# Patient Record
Sex: FEMALE | Race: WHITE | Employment: OTHER | URBAN - METROPOLITAN AREA
[De-identification: names, ages, dates, MRNs, and addresses within clinical notes are randomized per-mention and may not be internally consistent; named-entity substitution may affect disease eponyms.]

---

## 2019-06-24 ENCOUNTER — APPOINTMENT (OUTPATIENT)
Dept: GENERAL RADIOLOGY | Facility: CLINIC | Age: 64
DRG: 193 | End: 2019-06-24
Attending: FAMILY MEDICINE
Payer: COMMERCIAL

## 2019-06-24 ENCOUNTER — HOSPITAL ENCOUNTER (INPATIENT)
Facility: CLINIC | Age: 64
LOS: 3 days | Discharge: HOME | DRG: 193 | End: 2019-06-29
Attending: FAMILY MEDICINE | Admitting: INTERNAL MEDICINE
Payer: COMMERCIAL

## 2019-06-24 DIAGNOSIS — F33.0 MILD EPISODE OF RECURRENT MAJOR DEPRESSIVE DISORDER (H): ICD-10-CM

## 2019-06-24 DIAGNOSIS — J01.90 ACUTE NON-RECURRENT SINUSITIS, UNSPECIFIED LOCATION: ICD-10-CM

## 2019-06-24 DIAGNOSIS — J44.1 COPD EXACERBATION (H): ICD-10-CM

## 2019-06-24 DIAGNOSIS — J18.9 PNEUMONIA OF BOTH LOWER LOBES DUE TO INFECTIOUS ORGANISM: ICD-10-CM

## 2019-06-24 DIAGNOSIS — Z72.0 TOBACCO ABUSE: Primary | ICD-10-CM

## 2019-06-24 LAB
ALBUMIN SERPL-MCNC: 3.2 G/DL (ref 3.4–5)
ALP SERPL-CCNC: 87 U/L (ref 40–150)
ALT SERPL W P-5'-P-CCNC: 28 U/L (ref 0–50)
ANION GAP SERPL CALCULATED.3IONS-SCNC: 8 MMOL/L (ref 3–14)
AST SERPL W P-5'-P-CCNC: 18 U/L (ref 0–45)
BASOPHILS # BLD AUTO: 0.1 10E9/L (ref 0–0.2)
BASOPHILS NFR BLD AUTO: 0.4 %
BILIRUB SERPL-MCNC: 0.5 MG/DL (ref 0.2–1.3)
BUN SERPL-MCNC: 8 MG/DL (ref 7–30)
CALCIUM SERPL-MCNC: 9 MG/DL (ref 8.5–10.1)
CHLORIDE SERPL-SCNC: 102 MMOL/L (ref 94–109)
CO2 SERPL-SCNC: 26 MMOL/L (ref 20–32)
CREAT SERPL-MCNC: 0.44 MG/DL (ref 0.52–1.04)
DIFFERENTIAL METHOD BLD: ABNORMAL
EOSINOPHIL # BLD AUTO: 0 10E9/L (ref 0–0.7)
EOSINOPHIL NFR BLD AUTO: 0.1 %
ERYTHROCYTE [DISTWIDTH] IN BLOOD BY AUTOMATED COUNT: 12.7 % (ref 10–15)
GFR SERPL CREATININE-BSD FRML MDRD: >90 ML/MIN/{1.73_M2}
GLUCOSE SERPL-MCNC: 115 MG/DL (ref 70–99)
HCT VFR BLD AUTO: 40.8 % (ref 35–47)
HGB BLD-MCNC: 13.2 G/DL (ref 11.7–15.7)
IMM GRANULOCYTES # BLD: 0 10E9/L (ref 0–0.4)
IMM GRANULOCYTES NFR BLD: 0.3 %
INTERPRETATION ECG - MUSE: NORMAL
LACTATE BLD-SCNC: 1.1 MMOL/L (ref 0.7–2)
LYMPHOCYTES # BLD AUTO: 0.7 10E9/L (ref 0.8–5.3)
LYMPHOCYTES NFR BLD AUTO: 5.7 %
MCH RBC QN AUTO: 32.8 PG (ref 26.5–33)
MCHC RBC AUTO-ENTMCNC: 32.4 G/DL (ref 31.5–36.5)
MCV RBC AUTO: 101 FL (ref 78–100)
MONOCYTES # BLD AUTO: 1.7 10E9/L (ref 0–1.3)
MONOCYTES NFR BLD AUTO: 14.7 %
NEUTROPHILS # BLD AUTO: 9.2 10E9/L (ref 1.6–8.3)
NEUTROPHILS NFR BLD AUTO: 78.8 %
NRBC # BLD AUTO: 0 10*3/UL
NRBC BLD AUTO-RTO: 0 /100
NT-PROBNP SERPL-MCNC: 450 PG/ML (ref 0–900)
PLATELET # BLD AUTO: 297 10E9/L (ref 150–450)
POTASSIUM SERPL-SCNC: 3.9 MMOL/L (ref 3.4–5.3)
PROCALCITONIN SERPL-MCNC: 0.32 NG/ML
PROT SERPL-MCNC: 7.1 G/DL (ref 6.8–8.8)
RBC # BLD AUTO: 4.03 10E12/L (ref 3.8–5.2)
SODIUM SERPL-SCNC: 136 MMOL/L (ref 133–144)
TROPONIN I SERPL-MCNC: <0.015 UG/L (ref 0–0.04)
WBC # BLD AUTO: 11.7 10E9/L (ref 4–11)

## 2019-06-24 PROCEDURE — G0378 HOSPITAL OBSERVATION PER HR: HCPCS

## 2019-06-24 PROCEDURE — 25000128 H RX IP 250 OP 636: Performed by: FAMILY MEDICINE

## 2019-06-24 PROCEDURE — 25000132 ZZH RX MED GY IP 250 OP 250 PS 637: Performed by: PHYSICIAN ASSISTANT

## 2019-06-24 PROCEDURE — 80053 COMPREHEN METABOLIC PANEL: CPT | Performed by: EMERGENCY MEDICINE

## 2019-06-24 PROCEDURE — 93010 ELECTROCARDIOGRAM REPORT: CPT | Mod: Z6 | Performed by: FAMILY MEDICINE

## 2019-06-24 PROCEDURE — 25800030 ZZH RX IP 258 OP 636: Performed by: FAMILY MEDICINE

## 2019-06-24 PROCEDURE — 40000556 ZZH STATISTIC PERIPHERAL IV START W US GUIDANCE

## 2019-06-24 PROCEDURE — 96375 TX/PRO/DX INJ NEW DRUG ADDON: CPT

## 2019-06-24 PROCEDURE — 93005 ELECTROCARDIOGRAM TRACING: CPT

## 2019-06-24 PROCEDURE — 94640 AIRWAY INHALATION TREATMENT: CPT

## 2019-06-24 PROCEDURE — 87040 BLOOD CULTURE FOR BACTERIA: CPT | Performed by: FAMILY MEDICINE

## 2019-06-24 PROCEDURE — 83605 ASSAY OF LACTIC ACID: CPT | Performed by: EMERGENCY MEDICINE

## 2019-06-24 PROCEDURE — 85025 COMPLETE CBC W/AUTO DIFF WBC: CPT | Performed by: EMERGENCY MEDICINE

## 2019-06-24 PROCEDURE — 84145 PROCALCITONIN (PCT): CPT | Performed by: EMERGENCY MEDICINE

## 2019-06-24 PROCEDURE — 40000275 ZZH STATISTIC RCP TIME EA 10 MIN

## 2019-06-24 PROCEDURE — 25000125 ZZHC RX 250: Performed by: PHYSICIAN ASSISTANT

## 2019-06-24 PROCEDURE — 25000125 ZZHC RX 250: Performed by: FAMILY MEDICINE

## 2019-06-24 PROCEDURE — 96361 HYDRATE IV INFUSION ADD-ON: CPT

## 2019-06-24 PROCEDURE — 71045 X-RAY EXAM CHEST 1 VIEW: CPT

## 2019-06-24 PROCEDURE — 84484 ASSAY OF TROPONIN QUANT: CPT | Performed by: EMERGENCY MEDICINE

## 2019-06-24 PROCEDURE — 96365 THER/PROPH/DIAG IV INF INIT: CPT

## 2019-06-24 PROCEDURE — 94640 AIRWAY INHALATION TREATMENT: CPT | Mod: 76

## 2019-06-24 PROCEDURE — 25000132 ZZH RX MED GY IP 250 OP 250 PS 637: Performed by: FAMILY MEDICINE

## 2019-06-24 PROCEDURE — 99285 EMERGENCY DEPT VISIT HI MDM: CPT | Mod: 25 | Performed by: FAMILY MEDICINE

## 2019-06-24 PROCEDURE — 99285 EMERGENCY DEPT VISIT HI MDM: CPT | Mod: 25

## 2019-06-24 PROCEDURE — 83880 ASSAY OF NATRIURETIC PEPTIDE: CPT | Performed by: EMERGENCY MEDICINE

## 2019-06-24 RX ORDER — IPRATROPIUM BROMIDE AND ALBUTEROL SULFATE 2.5; .5 MG/3ML; MG/3ML
3 SOLUTION RESPIRATORY (INHALATION) EVERY 4 HOURS
Status: DISCONTINUED | OUTPATIENT
Start: 2019-06-24 | End: 2019-06-25

## 2019-06-24 RX ORDER — METHYLPREDNISOLONE SODIUM SUCCINATE 125 MG/2ML
125 INJECTION, POWDER, LYOPHILIZED, FOR SOLUTION INTRAMUSCULAR; INTRAVENOUS ONCE
Status: COMPLETED | OUTPATIENT
Start: 2019-06-24 | End: 2019-06-24

## 2019-06-24 RX ORDER — ACETAMINOPHEN 325 MG/1
650 TABLET ORAL EVERY 4 HOURS PRN
Status: DISCONTINUED | OUTPATIENT
Start: 2019-06-24 | End: 2019-06-29 | Stop reason: HOSPADM

## 2019-06-24 RX ORDER — ALBUTEROL SULFATE 0.83 MG/ML
2.5 SOLUTION RESPIRATORY (INHALATION) 2 TIMES DAILY PRN
Status: ON HOLD | COMMUNITY
End: 2019-06-28

## 2019-06-24 RX ORDER — PREDNISONE 20 MG/1
40 TABLET ORAL DAILY
Status: COMPLETED | OUTPATIENT
Start: 2019-06-25 | End: 2019-06-29

## 2019-06-24 RX ORDER — ONDANSETRON 4 MG/1
4 TABLET, ORALLY DISINTEGRATING ORAL EVERY 6 HOURS PRN
Status: DISCONTINUED | OUTPATIENT
Start: 2019-06-24 | End: 2019-06-29 | Stop reason: HOSPADM

## 2019-06-24 RX ORDER — LOSARTAN POTASSIUM 50 MG/1
50 TABLET ORAL DAILY
Status: DISCONTINUED | OUTPATIENT
Start: 2019-06-24 | End: 2019-06-29 | Stop reason: HOSPADM

## 2019-06-24 RX ORDER — CITALOPRAM HYDROBROMIDE 20 MG/1
20 TABLET ORAL DAILY
Status: DISCONTINUED | OUTPATIENT
Start: 2019-06-24 | End: 2019-06-29 | Stop reason: HOSPADM

## 2019-06-24 RX ORDER — CEFTRIAXONE 2 G/1
2 INJECTION, POWDER, FOR SOLUTION INTRAMUSCULAR; INTRAVENOUS ONCE
Status: COMPLETED | OUTPATIENT
Start: 2019-06-24 | End: 2019-06-24

## 2019-06-24 RX ORDER — IPRATROPIUM BROMIDE AND ALBUTEROL SULFATE 2.5; .5 MG/3ML; MG/3ML
3 SOLUTION RESPIRATORY (INHALATION) ONCE
Status: COMPLETED | OUTPATIENT
Start: 2019-06-24 | End: 2019-06-24

## 2019-06-24 RX ORDER — OXYCODONE HYDROCHLORIDE 5 MG/1
5 TABLET ORAL ONCE
Status: COMPLETED | OUTPATIENT
Start: 2019-06-24 | End: 2019-06-24

## 2019-06-24 RX ORDER — CEFTRIAXONE 1 G/1
1 INJECTION, POWDER, FOR SOLUTION INTRAMUSCULAR; INTRAVENOUS EVERY 24 HOURS
Status: DISCONTINUED | OUTPATIENT
Start: 2019-06-25 | End: 2019-06-25

## 2019-06-24 RX ORDER — GABAPENTIN 100 MG/1
100 CAPSULE ORAL 3 TIMES DAILY PRN
Status: ON HOLD | COMMUNITY
End: 2019-06-29

## 2019-06-24 RX ORDER — ALBUTEROL SULFATE 0.83 MG/ML
3 SOLUTION RESPIRATORY (INHALATION)
Status: DISCONTINUED | OUTPATIENT
Start: 2019-06-24 | End: 2019-06-29 | Stop reason: HOSPADM

## 2019-06-24 RX ORDER — LOSARTAN POTASSIUM 50 MG/1
50 TABLET ORAL DAILY
Status: ON HOLD | COMMUNITY
End: 2019-06-29

## 2019-06-24 RX ORDER — TRAZODONE HYDROCHLORIDE 150 MG/1
150 TABLET ORAL
Status: ON HOLD | COMMUNITY
End: 2019-06-29

## 2019-06-24 RX ORDER — ACETAMINOPHEN 500 MG
1000 TABLET ORAL ONCE
Status: COMPLETED | OUTPATIENT
Start: 2019-06-24 | End: 2019-06-24

## 2019-06-24 RX ORDER — GABAPENTIN 100 MG/1
100 CAPSULE ORAL 3 TIMES DAILY PRN
Status: DISCONTINUED | OUTPATIENT
Start: 2019-06-24 | End: 2019-06-29 | Stop reason: HOSPADM

## 2019-06-24 RX ORDER — CITALOPRAM HYDROBROMIDE 20 MG/1
20 TABLET ORAL DAILY
Status: ON HOLD | COMMUNITY
End: 2019-06-29

## 2019-06-24 RX ORDER — IBUPROFEN 200 MG
400 TABLET ORAL 2 TIMES DAILY PRN
COMMUNITY

## 2019-06-24 RX ORDER — ALBUTEROL SULFATE 90 UG/1
1-2 AEROSOL, METERED RESPIRATORY (INHALATION) EVERY 6 HOURS PRN
Status: ON HOLD | COMMUNITY
End: 2019-06-28

## 2019-06-24 RX ORDER — AZITHROMYCIN 250 MG/1
250 TABLET, FILM COATED ORAL DAILY
Status: COMPLETED | OUTPATIENT
Start: 2019-06-25 | End: 2019-06-28

## 2019-06-24 RX ORDER — LOSARTAN POTASSIUM 50 MG/1
50 TABLET ORAL DAILY
Status: DISCONTINUED | OUTPATIENT
Start: 2019-06-25 | End: 2019-06-24

## 2019-06-24 RX ORDER — CITALOPRAM HYDROBROMIDE 20 MG/1
20 TABLET ORAL DAILY
Status: DISCONTINUED | OUTPATIENT
Start: 2019-06-25 | End: 2019-06-24

## 2019-06-24 RX ORDER — ONDANSETRON 2 MG/ML
4 INJECTION INTRAMUSCULAR; INTRAVENOUS EVERY 6 HOURS PRN
Status: DISCONTINUED | OUTPATIENT
Start: 2019-06-24 | End: 2019-06-29 | Stop reason: HOSPADM

## 2019-06-24 RX ORDER — NALOXONE HYDROCHLORIDE 0.4 MG/ML
.1-.4 INJECTION, SOLUTION INTRAMUSCULAR; INTRAVENOUS; SUBCUTANEOUS
Status: DISCONTINUED | OUTPATIENT
Start: 2019-06-24 | End: 2019-06-29 | Stop reason: HOSPADM

## 2019-06-24 RX ADMIN — IPRATROPIUM BROMIDE AND ALBUTEROL SULFATE 3 ML: .5; 3 SOLUTION RESPIRATORY (INHALATION) at 17:31

## 2019-06-24 RX ADMIN — IPRATROPIUM BROMIDE AND ALBUTEROL SULFATE 3 ML: .5; 3 SOLUTION RESPIRATORY (INHALATION) at 23:58

## 2019-06-24 RX ADMIN — CEFTRIAXONE SODIUM 2 G: 2 INJECTION, POWDER, FOR SOLUTION INTRAMUSCULAR; INTRAVENOUS at 19:35

## 2019-06-24 RX ADMIN — IPRATROPIUM BROMIDE AND ALBUTEROL SULFATE 3 ML: .5; 3 SOLUTION RESPIRATORY (INHALATION) at 21:01

## 2019-06-24 RX ADMIN — METHYLPREDNISOLONE SODIUM SUCCINATE 125 MG: 125 INJECTION, POWDER, FOR SOLUTION INTRAMUSCULAR; INTRAVENOUS at 19:35

## 2019-06-24 RX ADMIN — OXYCODONE HYDROCHLORIDE 5 MG: 5 TABLET ORAL at 17:31

## 2019-06-24 RX ADMIN — LOSARTAN POTASSIUM 50 MG: 50 TABLET, FILM COATED ORAL at 23:48

## 2019-06-24 RX ADMIN — CITALOPRAM HYDROBROMIDE 20 MG: 20 TABLET ORAL at 23:48

## 2019-06-24 RX ADMIN — AZITHROMYCIN MONOHYDRATE 500 MG: 500 INJECTION, POWDER, LYOPHILIZED, FOR SOLUTION INTRAVENOUS at 20:53

## 2019-06-24 RX ADMIN — IPRATROPIUM BROMIDE AND ALBUTEROL SULFATE 3 ML: .5; 3 SOLUTION RESPIRATORY (INHALATION) at 19:35

## 2019-06-24 RX ADMIN — SODIUM CHLORIDE 1000 ML: 900 INJECTION, SOLUTION INTRAVENOUS at 17:31

## 2019-06-24 RX ADMIN — ACETAMINOPHEN 1000 MG: 500 TABLET ORAL at 17:31

## 2019-06-24 ASSESSMENT — ENCOUNTER SYMPTOMS
EYE DISCHARGE: 1
COUGH: 1
SHORTNESS OF BREATH: 1
FEVER: 1
EYE PAIN: 1

## 2019-06-24 NOTE — ED PROVIDER NOTES
[place] [facility] ([facility])  June 24, 2019    History     Chief Complaint   Patient presents with     Shortness of Breath     HPI  Teetee Reynolds is a 63 year old female with history notable for COPD and HTN who presents to the ED from clinic for 5 days of ongoing URI symptoms.  Patient states that she has been feeling unwell, including cough, shortness of breath, drainage from her eyes, bilateral eye pain, and bilateral ear pain L>R.  She states that she has difficulty sleeping due to her symptoms.  She states that she is been taking ibuprofen and aspirin for symptomatic management.  Patient reports that she does have previous history of pneumonia, but denies being hospitalized for this.  She states that she is not on home O2 and uses an albuterol nebulizer at home.  She denies using steroid inhalers, taking prednisone currently, or using DuoNeb's at home.  Patient notes increasing cough without hemoptysis.  No significant chest pain otherwise no leg pain or leg swelling otherwise noted.  Patient had history of pneumonia in the past before.  She typically is using her nebulizer machine she is prescribed Advair but does not use this as it makes her voice worse.  As noted patient was seen in clinic sats were in the 80s sent to the ER for further evaluation.  Patient feels better on oxygen here.  Is not on chronic oxygen normally at home.    PAST MEDICAL HISTORY  Past Medical History:   Diagnosis Date     Anxiety      COPD (chronic obstructive pulmonary disease) (H)      Depressive disorder      Hypertension      PAST SURGICAL HISTORY  History reviewed. No pertinent surgical history.  FAMILY HISTORY  No family history on file.  SOCIAL HISTORY  Social History     Tobacco Use     Smoking status: Not on file   Substance Use Topics     Alcohol use: Not on file     MEDICATIONS  Current Facility-Administered Medications   Medication     acetaminophen (TYLENOL) tablet 650 mg     albuterol (PROVENTIL)  neb solution 2.5 mg     aspirin (ASA) EC tablet 325 mg     azithromycin (ZITHROMAX) tablet 250 mg     benzocaine-menthol (CEPACOL) 15-3.6 MG lozenge 1 lozenge     cefTRIAXone (ROCEPHIN) 1 g vial to attach to  mL bag for ADULTS or NS 50 mL bag for PEDS     citalopram (celeXA) tablet 20 mg     gabapentin (NEURONTIN) capsule 100 mg     ipratropium - albuterol 0.5 mg/2.5 mg/3 mL (DUONEB) neb solution 3 mL     losartan (COZAAR) tablet 50 mg     melatonin tablet 1 mg     naloxone (NARCAN) injection 0.1-0.4 mg     ondansetron (ZOFRAN-ODT) ODT tab 4 mg    Or     ondansetron (ZOFRAN) injection 4 mg     predniSONE (DELTASONE) tablet 40 mg     traZODone (DESYREL) tablet 150 mg     ALLERGIES  Allergies   Allergen Reactions     Lisinopril Cough       PAST MEDICAL HISTORY  Past Medical History:   Diagnosis Date     Anxiety      COPD (chronic obstructive pulmonary disease) (H)      Depressive disorder      Hypertension      PAST SURGICAL HISTORY  History reviewed. No pertinent surgical history.  FAMILY HISTORY  No family history on file.  SOCIAL HISTORY  Social History     Tobacco Use     Smoking status: Not on file   Substance Use Topics     Alcohol use: Not on file     MEDICATIONS  Current Facility-Administered Medications   Medication     acetaminophen (TYLENOL) tablet 650 mg     albuterol (PROVENTIL) neb solution 2.5 mg     aspirin (ASA) EC tablet 325 mg     azithromycin (ZITHROMAX) tablet 250 mg     benzocaine-menthol (CEPACOL) 15-3.6 MG lozenge 1 lozenge     cefTRIAXone (ROCEPHIN) 1 g vial to attach to  mL bag for ADULTS or NS 50 mL bag for PEDS     citalopram (celeXA) tablet 20 mg     gabapentin (NEURONTIN) capsule 100 mg     ipratropium - albuterol 0.5 mg/2.5 mg/3 mL (DUONEB) neb solution 3 mL     losartan (COZAAR) tablet 50 mg     melatonin tablet 1 mg     naloxone (NARCAN) injection 0.1-0.4 mg     ondansetron (ZOFRAN-ODT) ODT tab 4 mg    Or     ondansetron (ZOFRAN) injection 4 mg     predniSONE (DELTASONE)  tablet 40 mg     traZODone (DESYREL) tablet 150 mg     ALLERGIES  Allergies   Allergen Reactions     Lisinopril Cough       I have reviewed the Medications, Allergies, Past Medical and Surgical History, and Social History in the Epic system.    Review of Systems   Constitutional: Positive for activity change, chills, fatigue and fever (100.7 F). Negative for appetite change.   HENT: Positive for ear pain (bilateral), sinus pressure and sinus pain. Negative for trouble swallowing and voice change.    Eyes: Positive for pain (bilateral) and discharge (bilateral). Negative for visual disturbance.   Respiratory: Positive for cough, chest tightness, shortness of breath and wheezing.    Cardiovascular: Negative for chest pain and leg swelling.   Gastrointestinal: Negative for abdominal pain, nausea and vomiting.   Musculoskeletal: Negative for arthralgias and back pain.   Skin: Negative for color change and rash.   Allergic/Immunologic: Negative for immunocompromised state.   Neurological: Positive for weakness.        Generalized weakness noted.   Hematological: Does not bruise/bleed easily.   Psychiatric/Behavioral: Positive for decreased concentration and dysphoric mood. Negative for confusion.   All other systems reviewed and are negative.      Physical Exam   BP: (!) 139/108  Pulse: 109  Heart Rate: 114  Temp: 100.7  F (38.2  C)  Resp: 16  Weight: 61.2 kg (135 lb)  SpO2: 94 %      Physical Exam   Constitutional: She is oriented to person, place, and time. She appears well-developed and well-nourished. She appears distressed.   Patient moderate distress here in the ER but on O2 speaking full sentences not confused or combative.   HENT:   Head: Normocephalic and atraumatic.   Patient with some sinus tenderness on exam but no facial fullness.  TM examination the TMs appeared normal may be some mild fluid but no acute infection mild hyperemia of the right canal no drainage or lesions   Eyes: Pupils are equal, round, and  reactive to light. Conjunctivae and EOM are normal. No scleral icterus.   Neck: Normal range of motion. Neck supple. No JVD present. No tracheal deviation present.   Cardiovascular: Normal rate and regular rhythm.   Pulmonary/Chest: She is in respiratory distress. She has wheezes. She has rales.   Patient breath sounds were equal but mildly distant.  Some extra wheezing noted.  Some crackles in the bases noted.   Abdominal: She exhibits no distension and no mass. There is no tenderness. There is no guarding.   Neurological: She is alert and oriented to person, place, and time.   Skin: Skin is warm and dry. Capillary refill takes less than 2 seconds. No rash noted. She is not diaphoretic. No erythema. No pallor.   Psychiatric:   appropriate   Nursing note and vitals reviewed.      ED Course        Procedures    5:06 PM  The patient was seen and examined by Dr. Little in Room 7.            Patient evaluated here in the ER.  She was on oxygen here with sats maintaining 94%.  Patient able speak full sentences.  Given DuoNeb with marked relief feeling better.  Maintaining oxygen saturations to at least 90 to 91% off O2 but patient was placed back on oxygen while here in the ER.  Portal chest x-ray done revealing some questionable bibasilar infiltrates noted but no marked effusion no pneumothorax.  White count 11.7.  EKG shows sinus tachycardia.    Patient troponin BNP otherwise within normal limits no sign of acute cardiac injury.  Patient here in the ER was given another DuoNeb.  Discussed with admission overnight agrees with plan.  Patient given site Medrol 25 mg IV along with this started on community acquired pneumonia protocol along with potential sign ascitic symptoms and given ceftriaxone and Zithromax IV here in the ER.  Discussed with ED observation will admit overnight for COPD exacerbation with concerns for bibasilar infection possible sinusitis 2.  Patient otherwise appears stable nontoxic is not appear to be  septic and agrees with plan.       EKG Interpretation:      Interpreted by Willard Little  Time reviewed: 1700  Symptoms at time of EKG: sob hypoxia   Rhythm: normal sinus tach  Rate:110  Axis: normal  Ectopy: none  Conduction: normal  ST Segments/ T Waves: Nonspecific ST-T wave changes  Q Waves: none  Comparison to prior: No old EKG available    Clinical Impression: sinus tach with nonspecific st changes          Critical Care time:  none             Labs Ordered and Resulted from Time of ED Arrival Up to the Time of Departure from the ED   CBC WITH PLATELETS DIFFERENTIAL - Abnormal; Notable for the following components:       Result Value    WBC 11.7 (*)      (*)     Absolute Neutrophil 9.2 (*)     Absolute Lymphocytes 0.7 (*)     Absolute Monocytes 1.7 (*)     All other components within normal limits   COMPREHENSIVE METABOLIC PANEL - Abnormal; Notable for the following components:    Glucose 115 (*)     Creatinine 0.44 (*)     Albumin 3.2 (*)     All other components within normal limits   LACTIC ACID WHOLE BLOOD   NT PROBNP INPATIENT   TROPONIN I           Results for orders placed or performed during the hospital encounter of 06/24/19   XR Chest Port 1 View    Narrative    XR CHEST PORT 1 VW  6/24/2019 5:27 PM      HISTORY: fever cough hypoxia    COMPARISON: None    FINDINGS:   Single] AP view of the chest. Partial visualization of cervical fusion  hardware.    Trachea is midline. Cardiomediastinal silhouette within normal limits.  Pulmonary vasculature is distinct. Bibasilar and upper left lower lobe  reticular opacities. Retrocardiac opacities. Trace left pleural  effusion. No pneumothorax.    Visualized portions of the abdomen, soft tissues and osseous thorax is  unremarkable.      Impression    IMPRESSION:   1. Retrocardiac opacities which could represent infection or  atelectasis.  2. Bibasilar and superior left lower lobe reticular opacities which  may represent atypical infection versus  pulmonary edema.  3. Trace left pleural effusion.    I have personally reviewed the examination and initial interpretation  and I agree with the findings.    AUGUSTA MALDONADO MD   CBC with platelets differential   Result Value Ref Range    WBC 11.7 (H) 4.0 - 11.0 10e9/L    RBC Count 4.03 3.8 - 5.2 10e12/L    Hemoglobin 13.2 11.7 - 15.7 g/dL    Hematocrit 40.8 35.0 - 47.0 %     (H) 78 - 100 fl    MCH 32.8 26.5 - 33.0 pg    MCHC 32.4 31.5 - 36.5 g/dL    RDW 12.7 10.0 - 15.0 %    Platelet Count 297 150 - 450 10e9/L    Diff Method Automated Method     % Neutrophils 78.8 %    % Lymphocytes 5.7 %    % Monocytes 14.7 %    % Eosinophils 0.1 %    % Basophils 0.4 %    % Immature Granulocytes 0.3 %    Nucleated RBCs 0 0 /100    Absolute Neutrophil 9.2 (H) 1.6 - 8.3 10e9/L    Absolute Lymphocytes 0.7 (L) 0.8 - 5.3 10e9/L    Absolute Monocytes 1.7 (H) 0.0 - 1.3 10e9/L    Absolute Eosinophils 0.0 0.0 - 0.7 10e9/L    Absolute Basophils 0.1 0.0 - 0.2 10e9/L    Abs Immature Granulocytes 0.0 0 - 0.4 10e9/L    Absolute Nucleated RBC 0.0    Comprehensive metabolic panel   Result Value Ref Range    Sodium 136 133 - 144 mmol/L    Potassium 3.9 3.4 - 5.3 mmol/L    Chloride 102 94 - 109 mmol/L    Carbon Dioxide 26 20 - 32 mmol/L    Anion Gap 8 3 - 14 mmol/L    Glucose 115 (H) 70 - 99 mg/dL    Urea Nitrogen 8 7 - 30 mg/dL    Creatinine 0.44 (L) 0.52 - 1.04 mg/dL    GFR Estimate >90 >60 mL/min/[1.73_m2]    GFR Estimate If Black >90 >60 mL/min/[1.73_m2]    Calcium 9.0 8.5 - 10.1 mg/dL    Bilirubin Total 0.5 0.2 - 1.3 mg/dL    Albumin 3.2 (L) 3.4 - 5.0 g/dL    Protein Total 7.1 6.8 - 8.8 g/dL    Alkaline Phosphatase 87 40 - 150 U/L    ALT 28 0 - 50 U/L    AST 18 0 - 45 U/L   Lactic acid   Result Value Ref Range    Lactic Acid 1.1 0.7 - 2.0 mmol/L   Nt probnp inpatient   Result Value Ref Range    N-Terminal Pro BNP Inpatient 450 0 - 900 pg/mL   Troponin I   Result Value Ref Range    Troponin I ES <0.015 0.000 - 0.045 ug/L    Procalcitonin   Result Value Ref Range    Procalcitonin 0.32 ng/ml   Basic metabolic panel   Result Value Ref Range    Sodium 136 133 - 144 mmol/L    Potassium 3.9 3.4 - 5.3 mmol/L    Chloride 102 94 - 109 mmol/L    Carbon Dioxide 29 20 - 32 mmol/L    Anion Gap 4 3 - 14 mmol/L    Glucose 232 (H) 70 - 99 mg/dL    Urea Nitrogen 11 7 - 30 mg/dL    Creatinine 0.43 (L) 0.52 - 1.04 mg/dL    GFR Estimate >90 >60 mL/min/[1.73_m2]    GFR Estimate If Black >90 >60 mL/min/[1.73_m2]    Calcium 8.9 8.5 - 10.1 mg/dL   CBC with platelets differential   Result Value Ref Range    WBC 8.9 4.0 - 11.0 10e9/L    RBC Count 3.75 (L) 3.8 - 5.2 10e12/L    Hemoglobin 12.1 11.7 - 15.7 g/dL    Hematocrit 38.8 35.0 - 47.0 %     (H) 78 - 100 fl    MCH 32.3 26.5 - 33.0 pg    MCHC 31.2 (L) 31.5 - 36.5 g/dL    RDW 12.6 10.0 - 15.0 %    Platelet Count 295 150 - 450 10e9/L    Diff Method Automated Method     % Neutrophils 86.0 %    % Lymphocytes 5.3 %    % Monocytes 8.0 %    % Eosinophils 0.0 %    % Basophils 0.2 %    % Immature Granulocytes 0.5 %    Nucleated RBCs 0 0 /100    Absolute Neutrophil 7.6 1.6 - 8.3 10e9/L    Absolute Lymphocytes 0.5 (L) 0.8 - 5.3 10e9/L    Absolute Monocytes 0.7 0.0 - 1.3 10e9/L    Absolute Eosinophils 0.0 0.0 - 0.7 10e9/L    Absolute Basophils 0.0 0.0 - 0.2 10e9/L    Abs Immature Granulocytes 0.0 0 - 0.4 10e9/L    Absolute Nucleated RBC 0.0    Hemoglobin A1c   Result Value Ref Range    Hemoglobin A1C 5.1 0 - 5.6 %   EKG 12 lead   Result Value Ref Range    Interpretation ECG Click View Image link to view waveform and result    Blood Culture ONE site   Result Value Ref Range    Specimen Description Blood Right Arm     Special Requests Received in aerobic bottle only     Culture Micro No growth after 11 hours          Assessments & Plan (with Medical Decision Making)  63-year-old female with history of COPD was not on home O2 presents the ER with 5 days of cough sinus symptoms etc.  No chest pain noted.  She was  hypoxic in the clinic sent to the ER on O2 her sats are in the mid 90s she is speaking full sentences not confused or combative.  Lactic acid otherwise was normal.  Cardiac eval mild sinus tachycardia but no ischemic changes seen troponin BNP negative white count slightly elevated 11.7 other labs stable chest x-ray concern for bibasilar infiltrates mild.  No effusion or pneumothorax.  Patient given 2 DuoNeb Solu-Medrol feeling better appears stable started on Zithromax and ceftriaxone.  Has some sign ascitic symptoms also.  Will be treated ED observation for COPD exacerbation with mild underlying pneumonia and sinusitis patient agrees with plan stable         I have reviewed the nursing notes.    I have reviewed the findings, diagnosis, plan and need for follow up with the patient.       Medication List      There are no discharge medications for this visit.         Final diagnoses:   COPD exacerbation (H)   Pneumonia of both lower lobes due to infectious organism (H)   Acute non-recurrent sinusitis, unspecified location     IGriffin, am serving as a trained medical scribe to document services personally performed by Willard Little MD, based on the provider's statements to me.      IWillard MD, was physically present and have reviewed and verified the accuracy of this note documented by Griffin De Leon.     6/24/2019   [facility] EMERGENCY DEPARTMENT    This note was created at least in part by the use of dragon voice dictation system. Inadvertent typographical errors may still exist.  Willard Little MD.         Willard Little MD  06/25/19 [id number]

## 2019-06-24 NOTE — LETTER
Transition Communication Hand-off for Care Transitions to Next Level of Care Provider    Name: Teetee Reynolds  : 1955  MRN #: [id number]  Primary Care Provider: José Miguel Milan     Primary Clinic: [address] [address]     Reason for Hospitalization:  COPD exacerbation (H) [J44.1]  Pneumonia of both lower lobes due to infectious organism (H) [J18.1]  Acute non-recurrent sinusitis, unspecified location [J01.90]  Admit Date/Time: 2019  4:10 PM  Discharge Date: 2019  Payor Source: Payor: [employer] / Plan: [employer] OPEN ACCESS / Product Type: HMO /     Readmission Assessment Measure (DARIN) Risk Score/category: 4/Low         Reason for Communication Hand-off Referral: Multiple providers/specialties    Discharge Plan:  Follow up with primary care provider, José Miguel Milan, within 7 days for hospital follow- up.  No follow up labs or test are needed.     Follow up with pulmonology for full consultation and PFTs on discharge.          Concern for non-adherence with plan of care:   No  Discharge Needs Assessment:  Needs      Most Recent Value   Anticipated Changes Related to Illness  none        Follow-up specialty is recommended: Yes    Follow-up plan:    Future Appointments   Date Time Provider Department Center   2019  8:30 AM  PFL B [facility]   2019  9:40 AM Gissel Dennis MD [facility]     Yue An RNCC, BSN    [facility]    Medicine Group  [address] [place] [id number]    [email or web address]  [email or web address]    Office: [phone] Pager: [phone]  To contact weekend RNCC, dial [phone] and enter pager number [phone] at prompt. This pager can not be contacted by text page or outside line.          AVS/Discharge Summary is the source of truth; this is a helpful guide for improved communication of patient story

## 2019-06-24 NOTE — ED TRIAGE NOTES
Pt comes in from clinic with sob and cough. Per EMS, o2 sats in mid 80s and sbp 200 on scene. Pt now on 4L nc, o2 sats 94%. Asa and ng given, sbp 139. Alert and oriented. Hx copd, seen in clinic for suspected pneumonia.

## 2019-06-24 NOTE — LETTER
My COPD Action Plan     Name: Teetee Reynolds    YOB: 1955   Date: 6/26/2019    My doctor: No name on file.   My clinic: [address] [facility] [address] [address] [phone]  My Controller Medicine: ADVAIR DISKUS      Dose: 250-50 MCG/DOSE inhaler INHALE ONE PUFF BY MOUTH TWICE A DAY     My Rescue Medicine: Albuterol nebulizer solution , Albu MDI inhaler with spacer    Dose: Take 2.5 mg by nebulization 2 times daily as needed or Inhale 1-2 puffs into the lungs every 6 hours as needed.      My Flare Up Medicine:    Dose:      My COPD Severity: Not known yet; need PFTs     Use of Oxygen:      Make sure you've had your pneumonia and influenza vaccines.          GREEN ZONE       Doing well today      Usual level of activity and exercise    Usual amount of cough and mucus    No shortness of breath    Usual level of health (thinking clearly, sleeping well, feel like eating) Actions:      Take daily medicines    Use oxygen as prescribed    Follow regular exercise and diet plan    Avoid cigarette smoke and other irritants that harm the lungs           YELLOW ZONE          Having a bad day or flare up      Short of breath more than usual    A lot more sputum (mucus) than usual    Sputum looks yellow, green, tan, brown or bloody    More coughing or wheezing    Fever or chills    Less energy; trouble completing activities    Trouble thinking or focusing    Using quick relief inhaler or nebulizer more often    Poor sleep; symptoms wake me up    Do not feel like eating Actions:      Get plenty of rest    Take daily medicines    Use quick relief inhaler every *** hours    If you use oxygen, call you doctor to see if you should adjust your oxygen    Do breathing exercises or other things to help you relax    Let a loved one, friend or neighbor know you are feeling worse    Call your care team if you have 2 or more symptoms.  Start taking steroids or antibiotics if directed by  your care team           RED ZONE       Need medical care now      Severe shortness of breath (feel you can't breathe)    Fever, chills    Not enough breath to do any activity    Trouble coughing up mucus, walking or talking    Blood in mucus    Frequent coughing   Rescue medicines are not working    Not able to sleep because of breathing    Feel confused or drowsy    Chest pain    Actions:      Call your health care team.  If you cannot reach your care team, call 911 or go to the emergency room.        Annual Reminders:  Meet with Care Team, Flu Shot every Fall  Pharmacy: Data Unavailable

## 2019-06-25 ENCOUNTER — APPOINTMENT (OUTPATIENT)
Dept: GENERAL RADIOLOGY | Facility: CLINIC | Age: 64
DRG: 193 | End: 2019-06-25
Attending: INTERNAL MEDICINE
Payer: COMMERCIAL

## 2019-06-25 LAB
ANION GAP SERPL CALCULATED.3IONS-SCNC: 4 MMOL/L (ref 3–14)
BASOPHILS # BLD AUTO: 0 10E9/L (ref 0–0.2)
BASOPHILS NFR BLD AUTO: 0.2 %
BUN SERPL-MCNC: 11 MG/DL (ref 7–30)
CALCIUM SERPL-MCNC: 8.9 MG/DL (ref 8.5–10.1)
CHLORIDE SERPL-SCNC: 102 MMOL/L (ref 94–109)
CO2 SERPL-SCNC: 29 MMOL/L (ref 20–32)
CREAT SERPL-MCNC: 0.43 MG/DL (ref 0.52–1.04)
DIFFERENTIAL METHOD BLD: ABNORMAL
EOSINOPHIL # BLD AUTO: 0 10E9/L (ref 0–0.7)
EOSINOPHIL NFR BLD AUTO: 0 %
ERYTHROCYTE [DISTWIDTH] IN BLOOD BY AUTOMATED COUNT: 12.6 % (ref 10–15)
GFR SERPL CREATININE-BSD FRML MDRD: >90 ML/MIN/{1.73_M2}
GLUCOSE SERPL-MCNC: 232 MG/DL (ref 70–99)
HBA1C MFR BLD: 5.1 % (ref 0–5.6)
HCT VFR BLD AUTO: 38.8 % (ref 35–47)
HGB BLD-MCNC: 12.1 G/DL (ref 11.7–15.7)
IMM GRANULOCYTES # BLD: 0 10E9/L (ref 0–0.4)
IMM GRANULOCYTES NFR BLD: 0.5 %
LYMPHOCYTES # BLD AUTO: 0.5 10E9/L (ref 0.8–5.3)
LYMPHOCYTES NFR BLD AUTO: 5.3 %
MCH RBC QN AUTO: 32.3 PG (ref 26.5–33)
MCHC RBC AUTO-ENTMCNC: 31.2 G/DL (ref 31.5–36.5)
MCV RBC AUTO: 104 FL (ref 78–100)
MONOCYTES # BLD AUTO: 0.7 10E9/L (ref 0–1.3)
MONOCYTES NFR BLD AUTO: 8 %
NEUTROPHILS # BLD AUTO: 7.6 10E9/L (ref 1.6–8.3)
NEUTROPHILS NFR BLD AUTO: 86 %
NRBC # BLD AUTO: 0 10*3/UL
NRBC BLD AUTO-RTO: 0 /100
PLATELET # BLD AUTO: 295 10E9/L (ref 150–450)
POTASSIUM SERPL-SCNC: 3.9 MMOL/L (ref 3.4–5.3)
RBC # BLD AUTO: 3.75 10E12/L (ref 3.8–5.2)
SODIUM SERPL-SCNC: 136 MMOL/L (ref 133–144)
WBC # BLD AUTO: 8.9 10E9/L (ref 4–11)

## 2019-06-25 PROCEDURE — 25000125 ZZHC RX 250: Performed by: PHYSICIAN ASSISTANT

## 2019-06-25 PROCEDURE — 25000128 H RX IP 250 OP 636: Performed by: INTERNAL MEDICINE

## 2019-06-25 PROCEDURE — 83036 HEMOGLOBIN GLYCOSYLATED A1C: CPT | Performed by: PHYSICIAN ASSISTANT

## 2019-06-25 PROCEDURE — G0378 HOSPITAL OBSERVATION PER HR: HCPCS

## 2019-06-25 PROCEDURE — 25000132 ZZH RX MED GY IP 250 OP 250 PS 637: Performed by: NURSE PRACTITIONER

## 2019-06-25 PROCEDURE — 36415 COLL VENOUS BLD VENIPUNCTURE: CPT | Performed by: PHYSICIAN ASSISTANT

## 2019-06-25 PROCEDURE — 25000131 ZZH RX MED GY IP 250 OP 636 PS 637: Performed by: PHYSICIAN ASSISTANT

## 2019-06-25 PROCEDURE — 71046 X-RAY EXAM CHEST 2 VIEWS: CPT

## 2019-06-25 PROCEDURE — 94640 AIRWAY INHALATION TREATMENT: CPT | Mod: 76

## 2019-06-25 PROCEDURE — 40000275 ZZH STATISTIC RCP TIME EA 10 MIN

## 2019-06-25 PROCEDURE — 96376 TX/PRO/DX INJ SAME DRUG ADON: CPT

## 2019-06-25 PROCEDURE — 80048 BASIC METABOLIC PNL TOTAL CA: CPT | Performed by: PHYSICIAN ASSISTANT

## 2019-06-25 PROCEDURE — 25000125 ZZHC RX 250: Performed by: INTERNAL MEDICINE

## 2019-06-25 PROCEDURE — 94799 UNLISTED PULMONARY SVC/PX: CPT

## 2019-06-25 PROCEDURE — 40000274 ZZH STATISTIC RCP CONSULT EA 30 MIN

## 2019-06-25 PROCEDURE — 85025 COMPLETE CBC W/AUTO DIFF WBC: CPT | Performed by: PHYSICIAN ASSISTANT

## 2019-06-25 PROCEDURE — 87899 AGENT NOS ASSAY W/OPTIC: CPT | Performed by: INTERNAL MEDICINE

## 2019-06-25 PROCEDURE — 25000132 ZZH RX MED GY IP 250 OP 250 PS 637: Performed by: PHYSICIAN ASSISTANT

## 2019-06-25 RX ORDER — OXYMETAZOLINE HYDROCHLORIDE 0.05 G/100ML
2 SPRAY NASAL 2 TIMES DAILY
Status: COMPLETED | OUTPATIENT
Start: 2019-06-25 | End: 2019-06-28

## 2019-06-25 RX ORDER — CEFTRIAXONE 1 G/1
1 INJECTION, POWDER, FOR SOLUTION INTRAMUSCULAR; INTRAVENOUS ONCE
Status: DISCONTINUED | OUTPATIENT
Start: 2019-06-25 | End: 2019-06-25

## 2019-06-25 RX ORDER — CEFTRIAXONE 1 G/1
1 INJECTION, POWDER, FOR SOLUTION INTRAMUSCULAR; INTRAVENOUS EVERY 24 HOURS
Status: DISCONTINUED | OUTPATIENT
Start: 2019-06-25 | End: 2019-06-27

## 2019-06-25 RX ORDER — IPRATROPIUM BROMIDE AND ALBUTEROL SULFATE 2.5; .5 MG/3ML; MG/3ML
3 SOLUTION RESPIRATORY (INHALATION)
Status: DISCONTINUED | OUTPATIENT
Start: 2019-06-25 | End: 2019-06-26

## 2019-06-25 RX ADMIN — CITALOPRAM HYDROBROMIDE 20 MG: 20 TABLET ORAL at 21:30

## 2019-06-25 RX ADMIN — ASPIRIN 325 MG: 325 TABLET, DELAYED RELEASE ORAL at 10:16

## 2019-06-25 RX ADMIN — BENZOCAINE, MENTHOL 1 LOZENGE: 15; 3.6 LOZENGE ORAL at 10:16

## 2019-06-25 RX ADMIN — CEFTRIAXONE 1 G: 1 INJECTION, POWDER, FOR SOLUTION INTRAMUSCULAR; INTRAVENOUS at 16:30

## 2019-06-25 RX ADMIN — PREDNISONE 40 MG: 20 TABLET ORAL at 08:36

## 2019-06-25 RX ADMIN — IPRATROPIUM BROMIDE AND ALBUTEROL SULFATE 3 ML: .5; 3 SOLUTION RESPIRATORY (INHALATION) at 13:04

## 2019-06-25 RX ADMIN — IPRATROPIUM BROMIDE AND ALBUTEROL SULFATE 3 ML: .5; 3 SOLUTION RESPIRATORY (INHALATION) at 16:24

## 2019-06-25 RX ADMIN — OXYMETAZOLINE HYDROCHLORIDE 2 SPRAY: 0.05 SPRAY NASAL at 20:17

## 2019-06-25 RX ADMIN — AZITHROMYCIN 250 MG: 250 TABLET, FILM COATED ORAL at 08:36

## 2019-06-25 RX ADMIN — IPRATROPIUM BROMIDE AND ALBUTEROL SULFATE 3 ML: .5; 3 SOLUTION RESPIRATORY (INHALATION) at 09:24

## 2019-06-25 RX ADMIN — IPRATROPIUM BROMIDE AND ALBUTEROL SULFATE 3 ML: .5; 3 SOLUTION RESPIRATORY (INHALATION) at 21:13

## 2019-06-25 RX ADMIN — LOSARTAN POTASSIUM 50 MG: 50 TABLET, FILM COATED ORAL at 21:30

## 2019-06-25 RX ADMIN — ACETAMINOPHEN 650 MG: 325 TABLET, FILM COATED ORAL at 08:36

## 2019-06-25 ASSESSMENT — ENCOUNTER SYMPTOMS
FATIGUE: 1
DYSPHORIC MOOD: 1
SINUS PAIN: 1
BRUISES/BLEEDS EASILY: 0
DECREASED CONCENTRATION: 1
WEAKNESS: 1
ABDOMINAL PAIN: 0
SINUS PRESSURE: 1
WHEEZING: 1
ARTHRALGIAS: 0
CHEST TIGHTNESS: 1
CONFUSION: 0
VOMITING: 0
CHILLS: 1
COLOR CHANGE: 0
TROUBLE SWALLOWING: 0
ACTIVITY CHANGE: 1
VOICE CHANGE: 0
NAUSEA: 0
APPETITE CHANGE: 0
BACK PAIN: 0

## 2019-06-25 NOTE — PROGRESS NOTES
Patient interviewed and examined. Labs, imaging and chart notes reviewed.  Teetee Reynolds has a history of COPD. She continues to smoke. She has been ill for about 6 days, initially with URI symptoms, maxillary and frontal sinus pressure, eye discharge and post nasal drip. Yesterday, cough became worse, and she began to be very short of breath, becoming winded with 3-4 steps. She had a fever on arrival in the ED and was hypoxemic. Lactate was normal. WBC mildly elevated at 11.7. CXR with possible infiltrates in the lower lobes. She was treated with ceftriaxone and azithromycin, solumedrol and nebulizers and admitted to the ED observation unit.    Today, she is feeling better. She is still coughing. She still has some facial pain.      Past Medical History:   Diagnosis Date     Anxiety      COPD (chronic obstructive pulmonary disease) (H)      Depressive disorder      Hypertension        History reviewed. No pertinent surgical history.    No family history on file.    Social History     Tobacco Use     Smoking status: Not on file   Substance Use Topics     Alcohol use: Not on file     Physical Exam:  Middle aged female in NAD.  /68 (BP Location: Right arm)   Pulse 77   Temp 98.6  F (37  C) (Oral)   Resp 16   Wt 61.2 kg (135 lb)   SpO2 90%   HEENT: PERRLA, EOMI, Sinus tenderness diffusely. Nasal congestion.  Neck: No bruit.  Lungs: Rales 1/2 up on left posteriorly. Basilar rales RLL. Scattered wheezes.  Cardiac: RRR. Normal S1 and S@. No murmur. No JVD.  Abdomen: Soft, non tender.  Extrem: No edema.    Labs/Imaging    Results for orders placed or performed during the hospital encounter of 06/24/19 (from the past 24 hour(s))   EKG 12 lead   Result Value Ref Range    Interpretation ECG Click View Image link to view waveform and result    CBC with platelets differential   Result Value Ref Range    WBC 11.7 (H) 4.0 - 11.0 10e9/L    RBC Count 4.03 3.8 - 5.2 10e12/L    Hemoglobin 13.2 11.7 - 15.7 g/dL    Hematocrit  40.8 35.0 - 47.0 %     (H) 78 - 100 fl    MCH 32.8 26.5 - 33.0 pg    MCHC 32.4 31.5 - 36.5 g/dL    RDW 12.7 10.0 - 15.0 %    Platelet Count 297 150 - 450 10e9/L    Diff Method Automated Method     % Neutrophils 78.8 %    % Lymphocytes 5.7 %    % Monocytes 14.7 %    % Eosinophils 0.1 %    % Basophils 0.4 %    % Immature Granulocytes 0.3 %    Nucleated RBCs 0 0 /100    Absolute Neutrophil 9.2 (H) 1.6 - 8.3 10e9/L    Absolute Lymphocytes 0.7 (L) 0.8 - 5.3 10e9/L    Absolute Monocytes 1.7 (H) 0.0 - 1.3 10e9/L    Absolute Eosinophils 0.0 0.0 - 0.7 10e9/L    Absolute Basophils 0.1 0.0 - 0.2 10e9/L    Abs Immature Granulocytes 0.0 0 - 0.4 10e9/L    Absolute Nucleated RBC 0.0    Comprehensive metabolic panel   Result Value Ref Range    Sodium 136 133 - 144 mmol/L    Potassium 3.9 3.4 - 5.3 mmol/L    Chloride 102 94 - 109 mmol/L    Carbon Dioxide 26 20 - 32 mmol/L    Anion Gap 8 3 - 14 mmol/L    Glucose 115 (H) 70 - 99 mg/dL    Urea Nitrogen 8 7 - 30 mg/dL    Creatinine 0.44 (L) 0.52 - 1.04 mg/dL    GFR Estimate >90 >60 mL/min/[1.73_m2]    GFR Estimate If Black >90 >60 mL/min/[1.73_m2]    Calcium 9.0 8.5 - 10.1 mg/dL    Bilirubin Total 0.5 0.2 - 1.3 mg/dL    Albumin 3.2 (L) 3.4 - 5.0 g/dL    Protein Total 7.1 6.8 - 8.8 g/dL    Alkaline Phosphatase 87 40 - 150 U/L    ALT 28 0 - 50 U/L    AST 18 0 - 45 U/L   Lactic acid   Result Value Ref Range    Lactic Acid 1.1 0.7 - 2.0 mmol/L   Nt probnp inpatient   Result Value Ref Range    N-Terminal Pro BNP Inpatient 450 0 - 900 pg/mL   Troponin I   Result Value Ref Range    Troponin I ES <0.015 0.000 - 0.045 ug/L   Procalcitonin   Result Value Ref Range    Procalcitonin 0.32 ng/ml   Blood Culture ONE site   Result Value Ref Range    Specimen Description Blood Right Arm     Special Requests Received in aerobic bottle only     Culture Micro No growth after 11 hours    XR Chest Port 1 View    Narrative    XR CHEST PORT 1 VW  6/24/2019 5:27 PM      HISTORY: fever cough  hypoxia    COMPARISON: None    FINDINGS:   Single] AP view of the chest. Partial visualization of cervical fusion  hardware.    Trachea is midline. Cardiomediastinal silhouette within normal limits.  Pulmonary vasculature is distinct. Bibasilar and upper left lower lobe  reticular opacities. Retrocardiac opacities. Trace left pleural  effusion. No pneumothorax.    Visualized portions of the abdomen, soft tissues and osseous thorax is  unremarkable.      Impression    IMPRESSION:   1. Retrocardiac opacities which could represent infection or  atelectasis.  2. Bibasilar and superior left lower lobe reticular opacities which  may represent atypical infection versus pulmonary edema.  3. Trace left pleural effusion.    I have personally reviewed the examination and initial interpretation  and I agree with the findings.    AUGUSTA MALDONADO MD   Basic metabolic panel   Result Value Ref Range    Sodium 136 133 - 144 mmol/L    Potassium 3.9 3.4 - 5.3 mmol/L    Chloride 102 94 - 109 mmol/L    Carbon Dioxide 29 20 - 32 mmol/L    Anion Gap 4 3 - 14 mmol/L    Glucose 232 (H) 70 - 99 mg/dL    Urea Nitrogen 11 7 - 30 mg/dL    Creatinine 0.43 (L) 0.52 - 1.04 mg/dL    GFR Estimate >90 >60 mL/min/[1.73_m2]    GFR Estimate If Black >90 >60 mL/min/[1.73_m2]    Calcium 8.9 8.5 - 10.1 mg/dL   CBC with platelets differential   Result Value Ref Range    WBC 8.9 4.0 - 11.0 10e9/L    RBC Count 3.75 (L) 3.8 - 5.2 10e12/L    Hemoglobin 12.1 11.7 - 15.7 g/dL    Hematocrit 38.8 35.0 - 47.0 %     (H) 78 - 100 fl    MCH 32.3 26.5 - 33.0 pg    MCHC 31.2 (L) 31.5 - 36.5 g/dL    RDW 12.6 10.0 - 15.0 %    Platelet Count 295 150 - 450 10e9/L    Diff Method Automated Method     % Neutrophils 86.0 %    % Lymphocytes 5.3 %    % Monocytes 8.0 %    % Eosinophils 0.0 %    % Basophils 0.2 %    % Immature Granulocytes 0.5 %    Nucleated RBCs 0 0 /100    Absolute Neutrophil 7.6 1.6 - 8.3 10e9/L    Absolute Lymphocytes 0.5 (L) 0.8 - 5.3 10e9/L     Absolute Monocytes 0.7 0.0 - 1.3 10e9/L    Absolute Eosinophils 0.0 0.0 - 0.7 10e9/L    Absolute Basophils 0.0 0.0 - 0.2 10e9/L    Abs Immature Granulocytes 0.0 0 - 0.4 10e9/L    Absolute Nucleated RBC 0.0    Hemoglobin A1c   Result Value Ref Range    Hemoglobin A1C 5.1 0 - 5.6 %   XR Chest 2 Views    Narrative    Exam: XR CHEST 2 VW, 6/25/2019 8:12 AM    Indication: Possible LLL pneumonia    Comparison: 6/24/2019    Findings:   PA and lateral views of the chest. Partially visualized cervical  fusion hardware. Presumed nasal cannula oxygen tube overlies the film.  Cardiomediastinal silhouette is not enlarged. Improved retrocardiac  and basilar opacities. Possibly some mild venous congestion. No  pneumothorax. No large pleural effusion. Visualized upper abdomen is  unremarkable. Mild convex right curvature of the midthoracic spine. No  acute osseous abnormalities.      Impression    Impression: Improved retrocardiac and left basilar opacities. Possibly  some mild venous congestion.    I have personally reviewed the examination and initial interpretation  and I agree with the findings.    FINA RIVERA MD     Impression:  Middle aged female with a history of COPD presents with several days of URI symptoms with probable sinusitis and exam and XR consistent with LLL pneumonia. She is in less distress today, but still needing 2 liters oxygen by nasal cannula to maintain pulse ox of 90%. She has had no further fevers. WBC improved.    I think she would benefit from a second dose of IV ceftriaxone today. Continue azithromycin. Continue duonebs. Add Afrin for nasal congestion. I think she needs another night on the unit. Still requiring supplemental oxygen support.    Guicho Larios MD

## 2019-06-25 NOTE — PLAN OF CARE
- Diagnostic tests and consults completed and resulted: No   - Vital signs normal or at patient baseline: No, requiring oxygen to keep sats >90%   - Tolerating oral antibiotics or has plans for home infusion setup: Not yet   - Infection is improving: Pending   - Dyspnea improved and O2 sats greater than 88% on room air or prior home oxygen levels: Pending   - Returns to baseline functional status: No   - Safe disposition plan has been identified: Yes, plans to return home after D/C

## 2019-06-25 NOTE — PROGRESS NOTES
"[facility]  Daily Progress Note          Assessment & Plan:   Teetee Reynolds is a 63 year old female with history notable for COPD and HTN who presents to the ED from clinic for 5 days of ongoing URI symptoms.     1. COPD exacerbation/CAP: URI symptoms x 5 days. . In the ED, T 100.7 F, slightly tachycardic at 114, /108, R 16, and O2 sats 94% with 4L nasal cannula (denies home oxygen use). LAB: CMP unremarkable. CBC shows mild leukocytosis (11.7), HGB 13.2. Lactic acid 1.1. BNP WNL. Trop x 1 negative. CXR shows \"retrocardiac opacities which could represent infection or atelectasis. Bibasilar and superior left lower lobe reticular opacities which may represent atypical infection versus pulmonary edema and trace left pleural effusion. She has been on ceftriaxone and azithromycin in the observation unit as well as duobebs and prednisone.  O2 sats 92% on 2L nasal cannula, dropped to 88% on room air.  Patient feeling slightly better than yesterday.  Chest x-ray, 2 view showed improved retrocardiac and left basilar opacities, possibly mild venous congestion. No leukocytosis, afebrile today.  She has no PFTs in our system or Care Everywhere.  -continue rocephin, azithromycin  -duonebs Q 4 hours  -wean O2, keep O2 sats>88% on room air  -continue prednisone 40 mg po daily  -afrin nasal spray  -needs refill of albuterol inhaler at discharge, has nebs at home  -encouraged smoking cessation and follow up with PCP for PFTs     2. HTN: /68  -Continue with Losartan 50 mg daily  -Asprin 325 mg daily       3. Depression/anxiety:   -Continue with Gabapentin, Citalopram and Trazodone     4. Hyperglycemia: glucose 232, A1C 5.1.  Likely secondary to steroid use.    FEN: regular  Lines: PIV  Prophylaxis: anticipate short stay          Consults:   none         Discharge Planning:   Tomorrow am, if O2 sats>88% on room air        Interval History:   Teetee is feeling better this am, still some cough, " SOB, sore throat and sinus congestion.  She has been out of bed walking to the bathroom with no significant SOB.  She admits to still smoking, but is now interested in quitting.     ROS:   Constitutional: No fevers/chills. Tolerating diet.   Cardiovascular: No chest pain or palpitations.   Respiratory: +cough, +SOB  GI: No abdominal pain. No N/V.      : No urinary complaints.   Musculoskeletal: Denies pain.           Physical Exam:   /68 (BP Location: Right arm)   Pulse 77   Temp 98.6  F (37  C) (Oral)   Resp 16   Wt 61.2 kg (135 lb)   SpO2 90%      GENERAL: Alert and oriented x 3. NAD.   HEENT: Anicteric sclera. Mucous membranes moist. Sinus tenderness throughout  CV: RRR. S1, S2. No murmurs appreciated.   RESPIRATORY: Effort normal. Lungs with rales in bilateral lobes with scattered wheezing  GI: Abdomen soft and non distended with normoactive bowel sounds present in all quadrants. No tenderness, rebound, guarding.   NEUROLOGICAL: No focal deficits. Moves all extremities.    EXTREMITIES: No peripheral edema. Intact bilateral pedal pulses.   SKIN: No jaundice. No rashes.     Medication list reviewed.   Labs and imaging were reviewed.     CHRISTINE Norris, CNP  Ascom #[id number]

## 2019-06-25 NOTE — PLAN OF CARE
Outpatient/Observation goals to be met before discharge home:     - Diagnostic tests and consults completed and resulted - No  - Vital signs normal or at patient baseline - No  - Tolerating oral antibiotics or has plans for home infusion setup   - Infection is improving - No  - Dyspnea improved and O2 sats greater than 88% on room air or prior home oxygen levels - Pending  - Returns to baseline functional status - No  - Safe disposition plan has been identified - Yes

## 2019-06-25 NOTE — PROGRESS NOTES
Chart notes, imaging, vital signs and labs reviewed. Patient not examined tonight.  Middle aged female with history of COPD presents with 5 days of cough, sputum, nasal congestion and ear pain. Febrile in ED (100.7). Vitals and O2sat otherwise OK. WBC elevated. CXR with probable patchy LLL infiltrate on 1 view AP film.     Past Medical History:   Diagnosis Date     Anxiety      COPD (chronic obstructive pulmonary disease) (H)      Depressive disorder      Hypertension        History reviewed. No pertinent surgical history.    No family history on file.    Social History     Tobacco Use     Smoking status: Not on file   Substance Use Topics     Alcohol use: Not on file     BP (!) 152/91   Pulse 96   Temp 99.1  F (37.3  C) (Oral)   Resp 22   Wt 61.2 kg (135 lb)   SpO2 93%     Labs/Imaging    Results for orders placed or performed during the hospital encounter of 06/24/19 (from the past 24 hour(s))   EKG 12 lead   Result Value Ref Range    Interpretation ECG Click View Image link to view waveform and result    CBC with platelets differential   Result Value Ref Range    WBC 11.7 (H) 4.0 - 11.0 10e9/L    RBC Count 4.03 3.8 - 5.2 10e12/L    Hemoglobin 13.2 11.7 - 15.7 g/dL    Hematocrit 40.8 35.0 - 47.0 %     (H) 78 - 100 fl    MCH 32.8 26.5 - 33.0 pg    MCHC 32.4 31.5 - 36.5 g/dL    RDW 12.7 10.0 - 15.0 %    Platelet Count 297 150 - 450 10e9/L    Diff Method Automated Method     % Neutrophils 78.8 %    % Lymphocytes 5.7 %    % Monocytes 14.7 %    % Eosinophils 0.1 %    % Basophils 0.4 %    % Immature Granulocytes 0.3 %    Nucleated RBCs 0 0 /100    Absolute Neutrophil 9.2 (H) 1.6 - 8.3 10e9/L    Absolute Lymphocytes 0.7 (L) 0.8 - 5.3 10e9/L    Absolute Monocytes 1.7 (H) 0.0 - 1.3 10e9/L    Absolute Eosinophils 0.0 0.0 - 0.7 10e9/L    Absolute Basophils 0.1 0.0 - 0.2 10e9/L    Abs Immature Granulocytes 0.0 0 - 0.4 10e9/L    Absolute Nucleated RBC 0.0    Comprehensive metabolic panel   Result Value Ref Range     Sodium 136 133 - 144 mmol/L    Potassium 3.9 3.4 - 5.3 mmol/L    Chloride 102 94 - 109 mmol/L    Carbon Dioxide 26 20 - 32 mmol/L    Anion Gap 8 3 - 14 mmol/L    Glucose 115 (H) 70 - 99 mg/dL    Urea Nitrogen 8 7 - 30 mg/dL    Creatinine 0.44 (L) 0.52 - 1.04 mg/dL    GFR Estimate >90 >60 mL/min/[1.73_m2]    GFR Estimate If Black >90 >60 mL/min/[1.73_m2]    Calcium 9.0 8.5 - 10.1 mg/dL    Bilirubin Total 0.5 0.2 - 1.3 mg/dL    Albumin 3.2 (L) 3.4 - 5.0 g/dL    Protein Total 7.1 6.8 - 8.8 g/dL    Alkaline Phosphatase 87 40 - 150 U/L    ALT 28 0 - 50 U/L    AST 18 0 - 45 U/L   Lactic acid   Result Value Ref Range    Lactic Acid 1.1 0.7 - 2.0 mmol/L   Nt probnp inpatient   Result Value Ref Range    N-Terminal Pro BNP Inpatient 450 0 - 900 pg/mL   Troponin I   Result Value Ref Range    Troponin I ES <0.015 0.000 - 0.045 ug/L   Blood Culture ONE site   Result Value Ref Range    Specimen Description Blood Right Arm     Special Requests Received in aerobic bottle only     Culture Micro PENDING    XR Chest Port 1 View    Narrative    XR CHEST PORT 1 VW  6/24/2019 5:27 PM      HISTORY: fever cough hypoxia    COMPARISON: None    FINDINGS:   Single] AP view of the chest. Partial visualization of cervical fusion  hardware.    Trachea is midline. Cardiomediastinal silhouette within normal limits.  Pulmonary vasculature is distinct. Bibasilar and upper left lower lobe  reticular opacities. Retrocardiac opacities. Trace left pleural  effusion. No pneumothorax.    Visualized portions of the abdomen, soft tissues and osseous thorax is  unremarkable.      Impression    IMPRESSION:   1. Retrocardiac opacities which could represent infection or  atelectasis.  2. Bibasilar and superior left lower lobe reticular opacities which  may represent atypical infection versus pulmonary edema.  3. Trace left pleural effusion.    I have personally reviewed the examination and initial interpretation  and I agree with the findings.    AUGUSTA  MD ERICA     Agree with ceftriaxone and azithromycin, steroids and nebs. Will add on procalcitonin and legionella urine AGN. Significant overlying soft tissue artifact on CXR. Will obtain 2 view PA and lateral in AM.    Guicho Larios MD

## 2019-06-25 NOTE — H&P
"[facility] ED Observation Admission Note    Chief Complaint   Patient presents with     Shortness of Breath       Assessment/Plan:  Teetee Reynolds is a 63 year old female with history notable for COPD and HTN who presents to the ED from clinic for 5 days of ongoing URI symptoms.     1. COPD exacerbation/CAP: URI symptoms x 5 days. Presently has productive cough, SOB, rhinorrhea, tearing from the eyes, congestion, myalgia, and subjective fevers. She also reports left ear pain. Cough is constant and keep her up all night. She feels fatigued and tried throughout the day. She has decreased appetite. She has been using OTC cold medications without improvement. COPD is managed with Albuterol neb at home. Denies chest pain, nausea, vomiting or headaches. Has hx of previous pneumonia. In the ED, T 100.7 F, slightly tachycardic at 114, /108, R 16, and O2 sats 94% with 4L nasal cannula (denies home oxygen use). LAB: CMP unremarkable. CBC shows mild leukocytosis (11.7), HGB 13.2. Lactic acid 1.1. BNP WNL. Trop x 1 negative. CXR shows \"retrocardiac opacities which could represent infection or atelectasis. Bibasilar and superior left lower lobe reticular opacities which may represent atypical infection versus pulmonary edema and trace left pleural effusion. In the ED, pt received DuoNeb x 2, Solu-Medrol 125 mg IV , Oxycodone 5 mg x 1 as well as Rocephin IV and Zithromax . No significant improvement with breathing treatment in the ED. Pt is currently being admitted for continued neb treatment, steroids, and antibiotic treatment.     2. HTN: Hypertensive in the ED, 139/108. Spontaneously resolved.   -Continue with Losartan 50 mg daily  -Asprin 325 mg daily      3. Depression/anxiety:   -Continue with Gabapentin, Citalopram and Trazodone     HPI:    Teetee Reynolds is a 63 year old female with history notable for COPD and HTN who presents to the ED from clinic for 5 days of ongoing URI symptoms.  Patient states that she has been feeling unwell, " "including cough, shortness of breath, drainage from her eyes, bilateral eye pain, and bilateral ear pain L>R.  She states that she has difficulty sleeping due to her symptoms.  She states that she is been taking ibuprofen and aspirin for symptomatic management.  Patient reports that she does have previous history of pneumonia, but denies being hospitalized for this.  She states that she is not on home O2 and uses an albuterol nebulizer at home.  She denies using steroid inhalers, taking prednisone currently, or using DuoNeb's at home.    In the ED,  In the ED, T 100.7 F, slightly tachycardic at 114, /108, R 16, and O2 sats 94% with 4L nasal cannula (denies home oxygen use). LAB: CMP unremarkable. CBC shows mild leukocytosis (11.7), HGB 13.2. Lactic acid 1.1. BNP WNL. Trop x 1 negative. CXR shows \"retrocardiac opacities which could represent infection or atelectasis. Bibasilar and superior left lower lobe reticular opacities which may represent atypical infection versus pulmonary edema and trace left pleural effusion. In the ED, pt received DuoNeb x 2, Solu-Medrol 125 mg IV , Oxycodone 5 mg x 1 as well as Rocephin IV and Zithromax . No significant improvement with breathing treatment in the ED. Pt is currently being admitted for continued neb treatment, steroids, and antibiotic treatment.     On admission to the observation unit the patient was stable.    History:    Past Medical History:   Diagnosis Date     Anxiety      COPD (chronic obstructive pulmonary disease) (H)      Depressive disorder      Hypertension        History reviewed. No pertinent surgical history.    No family history on file.    Social History     Socioeconomic History     Marital status: Single     Spouse name: Not on file     Number of children: Not on file     Years of education: Not on file     Highest education level: Not on file   Occupational History     Not on file   Social Needs     Financial resource strain: Not on file     Food " insecurity:     Worry: Not on file     Inability: Not on file     Transportation needs:     Medical: Not on file     Non-medical: Not on file   Tobacco Use     Smoking status: Not on file   Substance and Sexual Activity     Alcohol use: Not on file     Drug use: Not on file     Sexual activity: Not on file   Lifestyle     Physical activity:     Days per week: Not on file     Minutes per session: Not on file     Stress: Not on file   Relationships     Social connections:     Talks on phone: Not on file     Gets together: Not on file     Attends Voodoo service: Not on file     Active member of club or organization: Not on file     Attends meetings of clubs or organizations: Not on file     Relationship status: Not on file     Intimate partner violence:     Fear of current or ex partner: Not on file     Emotionally abused: Not on file     Physically abused: Not on file     Forced sexual activity: Not on file   Other Topics Concern     Not on file   Social History Narrative     Not on file         No current facility-administered medications on file prior to encounter.   Current Outpatient Medications on File Prior to Encounter:  albuterol (PROAIR HFA/PROVENTIL HFA/VENTOLIN HFA) 108 (90 Base) MCG/ACT inhaler Inhale 1-2 puffs into the lungs every 6 hours as needed for shortness of breath / dyspnea or wheezing   albuterol (PROVENTIL) (2.5 MG/3ML) 0.083% neb solution Take 2.5 mg by nebulization 2 times daily as needed for shortness of breath / dyspnea or wheezing (Typically takes 1-2x daily)   aspirin (ASA) 325 MG EC tablet Take 325 mg by mouth every 6 hours as needed for moderate pain   citalopram (CELEXA) 20 MG tablet Take 20 mg by mouth daily   gabapentin (NEURONTIN) 100 MG capsule Take 100 mg by mouth 3 times daily as needed Take 1-3 capsules by mouth daily as needed.   ibuprofen (ADVIL/MOTRIN) 200 MG tablet Take 200 mg by mouth every 4 hours as needed for mild pain   losartan (COZAAR) 50 MG tablet Take 50 mg by  mouth daily   traZODone (DESYREL) 150 MG tablet Take 150 mg by mouth nightly as needed for sleep       Data:    Results for orders placed or performed during the hospital encounter of 06/24/19   XR Chest Port 1 View    Narrative    XR CHEST PORT 1 VW  6/24/2019 5:27 PM      HISTORY: fever cough hypoxia    COMPARISON: None    FINDINGS:   Single] AP view of the chest. Partial visualization of cervical fusion  hardware.    Trachea is midline. Cardiomediastinal silhouette within normal limits.  Pulmonary vasculature is distinct. Bibasilar and upper left lower lobe  reticular opacities. Retrocardiac opacities. Trace left pleural  effusion. No pneumothorax.    Visualized portions of the abdomen, soft tissues and osseous thorax is  unremarkable.      Impression    IMPRESSION:   1. Retrocardiac opacities which could represent infection or  atelectasis.  2. Bibasilar and superior left lower lobe reticular opacities which  may represent atypical infection versus pulmonary edema.  3. Trace left pleural effusion.    I have personally reviewed the examination and initial interpretation  and I agree with the findings.    AUGUSTA MALDONADO MD   CBC with platelets differential   Result Value Ref Range    WBC 11.7 (H) 4.0 - 11.0 10e9/L    RBC Count 4.03 3.8 - 5.2 10e12/L    Hemoglobin 13.2 11.7 - 15.7 g/dL    Hematocrit 40.8 35.0 - 47.0 %     (H) 78 - 100 fl    MCH 32.8 26.5 - 33.0 pg    MCHC 32.4 31.5 - 36.5 g/dL    RDW 12.7 10.0 - 15.0 %    Platelet Count 297 150 - 450 10e9/L    Diff Method Automated Method     % Neutrophils 78.8 %    % Lymphocytes 5.7 %    % Monocytes 14.7 %    % Eosinophils 0.1 %    % Basophils 0.4 %    % Immature Granulocytes 0.3 %    Nucleated RBCs 0 0 /100    Absolute Neutrophil 9.2 (H) 1.6 - 8.3 10e9/L    Absolute Lymphocytes 0.7 (L) 0.8 - 5.3 10e9/L    Absolute Monocytes 1.7 (H) 0.0 - 1.3 10e9/L    Absolute Eosinophils 0.0 0.0 - 0.7 10e9/L    Absolute Basophils 0.1 0.0 - 0.2 10e9/L    Abs Immature  Granulocytes 0.0 0 - 0.4 10e9/L    Absolute Nucleated RBC 0.0    Comprehensive metabolic panel   Result Value Ref Range    Sodium 136 133 - 144 mmol/L    Potassium 3.9 3.4 - 5.3 mmol/L    Chloride 102 94 - 109 mmol/L    Carbon Dioxide 26 20 - 32 mmol/L    Anion Gap 8 3 - 14 mmol/L    Glucose 115 (H) 70 - 99 mg/dL    Urea Nitrogen 8 7 - 30 mg/dL    Creatinine 0.44 (L) 0.52 - 1.04 mg/dL    GFR Estimate >90 >60 mL/min/[1.73_m2]    GFR Estimate If Black >90 >60 mL/min/[1.73_m2]    Calcium 9.0 8.5 - 10.1 mg/dL    Bilirubin Total 0.5 0.2 - 1.3 mg/dL    Albumin 3.2 (L) 3.4 - 5.0 g/dL    Protein Total 7.1 6.8 - 8.8 g/dL    Alkaline Phosphatase 87 40 - 150 U/L    ALT 28 0 - 50 U/L    AST 18 0 - 45 U/L   Lactic acid   Result Value Ref Range    Lactic Acid 1.1 0.7 - 2.0 mmol/L   Nt probnp inpatient   Result Value Ref Range    N-Terminal Pro BNP Inpatient 450 0 - 900 pg/mL   Troponin I   Result Value Ref Range    Troponin I ES <0.015 0.000 - 0.045 ug/L   EKG 12 lead   Result Value Ref Range    Interpretation ECG Click View Image link to view waveform and result    Blood Culture ONE site   Result Value Ref Range    Specimen Description Blood Right Arm     Special Requests Received in aerobic bottle only     Culture Micro PENDING              EKG Interpretation:         ROS:  REVIEW OF SYSTEMS:   General: fatigue   EYES: Negative for vision changes or eye problems   ENT:  Rhinorrhea, left ear pain, congestion   RESP: Cough and shortness of breath   CV: No chest pains or palpitations   GI: No nausea, vomiting, heartburn, abdominal pain, diarrhea, constipation or change in bowel habits   : No urinary frequency or dysuria, bladder or kidney problems   MUSCULOSKELETAL: No significant muscle or joint pains   NEUROLOGIC: No headaches, numbness, tingling, weakness, problems with balance or coordination   PSYCHIATRIC: No problems with anxiety, depression or mental health   HEME/IMMUNE/ALLERGY: No history of bleeding or clotting  problems or anemia. No allergies or immune system problems   ENDOCRINE: No history of thyroid disease, diabetes or other endocrine disorders   SKIN: No rashes,worrisome lesions or skin problems    PCP: RENATO HUYNH   CARDIAC RISK: HTN    10 point ROS negative other than the symptoms noted above.      Exam:    Vitals:  B/P: 133/87, T: 100.7, P: 91, R: 23     Physical Exam   Constitutional: Pt is oriented to person, place, and time.Pt appears well-developed and well-nourished.   HENT:   Head: Normocephalic and atraumatic.   Eyes: Conjunctivae are normal. Pupils are equal, round, and reactive to light.   Neck: Normal range of motion. Neck supple.   Cardiovascular: Normal rate, regular rhythm, normal heart sounds and intact distal pulses.    Pulmonary/Chest: Effort normal. Decreased breath sound bilaterally. No respiratory distress. Pt has no wheezes. Pt has no rales  Abdominal: Soft. Bowel sounds are normal. Pt exhibits no distension and no mass. No tenderness. Pt has no rebound and no guarding.   Musculoskeletal: Normal range of motion. Pt exhibits no edema.   Neurological: Pt is alert and oriented to person, place, and time. Normal reflexes.   Skin: Skin is warm and dry. No rash noted.   Psychiatric: Pt has a normal mood and affect. Behavior is normal. Judgment and thought content normal.     Consults: Low threshold   FEN: Regular   DVT prophylaxis: Early ambulation  Code Status: Full  Disposition: Stable vital signs. Patient return to baseline.  All labs/images reviewed    Signed:  Edilia Chakraborty PA-C  June 24, 2019 at 7:50 PM

## 2019-06-25 NOTE — PLAN OF CARE
Observation goals PRIOR TO DISCHARGE    Comments: -diagnostic tests and consults completed and resulted   -vital signs normal or at patient baseline - Yes  -tolerating oral antibiotics or has plans for home infusion setup   -infection is improving - Not yet  -dyspnea improved and O2 sats greater than 88% on room air or prior home oxygen levels   -returns to baseline functional status Not yet 4- L  -safe disposition plan has been identified Prior  Nurse to notify provider when observation goals have been met and patient is ready for discharge.    Pt had shower, Neb tx in place. AVSS,   BP (!) 152/91   Pulse 96   Temp 99.1  F (37.3  C) (Oral)   Resp 22   Wt 61.2 kg (135 lb)   SpO2 93%

## 2019-06-25 NOTE — PLAN OF CARE
Outpatient/Observation goals to be met before discharge home:      - Diagnostic tests and consults completed and resulted - No  - Vital signs normal or at patient baseline - No  - Tolerating oral antibiotics or has plans for home infusion setup - Yes  - Infection is improving - No  - Dyspnea improved and O2 sats greater than 88% on room air or prior home oxygen levels - Pending  - Returns to baseline functional status - No  - Safe disposition plan has been identified - Yes

## 2019-06-25 NOTE — PLAN OF CARE
- Diagnostic tests and consults completed and resulted: No   - Vital signs normal or at patient baseline: No   - Tolerating oral antibiotics or has plans for home infusion setup: Not yet   - Infection is improving: Pending   - Dyspnea improved and O2 sats greater than 88% on room air or prior home oxygen levels: Pending   - Returns to baseline functional status: No   - Safe disposition plan has been identified: Yes, plans to return home after D/C

## 2019-06-25 NOTE — PHARMACY-ADMISSION MEDICATION HISTORY
Admission medication history interview status for the 6/24/2019 admission is complete. See Epic admission navigator for allergy information, pharmacy, prior to admission medications and immunization status.     Medication history interview sources:  the patient (Teetee)     Changes made to PTA medication list (reason)  Added:     Proventil 2.5mg/3ml Neb solution    Ventolin Inhaler    Aspirin 325mg    Ibuprofen 200mg     Trazodone 150mg    Deleted: None    Changed:     Gabapentin 1 capsule (100mg) to 1-3 capsules by mouth daily as needed.       Additional medication history information (including reliability of information, actions taken by pharmacist):     Patient seemed to be a reliable historian and knew her medications well. The patient temporarily stopped taking her Citalopram 20mg, Gabapentin 100mg, and losartan 50mg three days ago (06/21) because she wasn't feeling well.     Furthermore, the patient seems to have compliance issues with her Citalopram as it was last filled 01/29/19 for a 90 days supply upon investigation. She does seem to fill her losartan regularly.The patient denies missing any doses other than not taking any of her matinence meds the last three days.       Prior to Admission medications    Medication Sig Last Dose Taking? Auth Provider   albuterol (PROAIR HFA/PROVENTIL HFA/VENTOLIN HFA) 108 (90 Base) MCG/ACT inhaler Inhale 1-2 puffs into the lungs every 6 hours as needed for shortness of breath / dyspnea or wheezing 6/23/2019 at Unknown time Yes Unknown, Entered By History   albuterol (PROVENTIL) (2.5 MG/3ML) 0.083% neb solution Take 2.5 mg by nebulization 2 times daily as needed for shortness of breath / dyspnea or wheezing (Typically takes 1-2x daily) 6/24/2019 at AM Yes Unknown, Entered By History   aspirin (ASA) 325 MG EC tablet Take 325 mg by mouth every 6 hours as needed for moderate pain 6/23/2019 at Unknown time Yes Unknown, Entered By History   citalopram (CELEXA) 20 MG tablet Take 20  mg by mouth daily 6/21/2019 at Unknown time Yes Reported, Patient   gabapentin (NEURONTIN) 100 MG capsule Take 100 mg by mouth 3 times daily as needed Take 1-3 capsules by mouth daily as needed. 6/21/2019 at Unknown time Yes Reported, Patient   ibuprofen (ADVIL/MOTRIN) 200 MG tablet Take 200 mg by mouth every 4 hours as needed for mild pain 6/23/2019 at Unknown time Yes Unknown, Entered By History   losartan (COZAAR) 50 MG tablet Take 50 mg by mouth daily 6/21/2019 at Unknown time Yes Reported, Patient   traZODone (DESYREL) 150 MG tablet Take 150 mg by mouth nightly as needed for sleep 6/23/2019 at PM Yes Unknown, Entered By History         Medication history completed by:     Morena Mike   Student Pharmacist  [facility]

## 2019-06-26 PROBLEM — J44.1 COPD EXACERBATION (H): Status: ACTIVE | Noted: 2019-06-26

## 2019-06-26 LAB
ERYTHROCYTE [DISTWIDTH] IN BLOOD BY AUTOMATED COUNT: 12.6 % (ref 10–15)
HCT VFR BLD AUTO: 41.2 % (ref 35–47)
HGB BLD-MCNC: 13.1 G/DL (ref 11.7–15.7)
L PNEUMO1 AG UR QL IA: NORMAL
MCH RBC QN AUTO: 32 PG (ref 26.5–33)
MCHC RBC AUTO-ENTMCNC: 31.8 G/DL (ref 31.5–36.5)
MCV RBC AUTO: 101 FL (ref 78–100)
PLATELET # BLD AUTO: 394 10E9/L (ref 150–450)
RBC # BLD AUTO: 4.09 10E12/L (ref 3.8–5.2)
SPECIMEN SOURCE: NORMAL
WBC # BLD AUTO: 14.4 10E9/L (ref 4–11)

## 2019-06-26 PROCEDURE — 40000141 ZZH STATISTIC PERIPHERAL IV START W/O US GUIDANCE

## 2019-06-26 PROCEDURE — 25000125 ZZHC RX 250: Performed by: NURSE PRACTITIONER

## 2019-06-26 PROCEDURE — 96375 TX/PRO/DX INJ NEW DRUG ADDON: CPT

## 2019-06-26 PROCEDURE — 25000125 ZZHC RX 250: Performed by: INTERNAL MEDICINE

## 2019-06-26 PROCEDURE — G0378 HOSPITAL OBSERVATION PER HR: HCPCS

## 2019-06-26 PROCEDURE — 25000132 ZZH RX MED GY IP 250 OP 250 PS 637: Performed by: PHYSICIAN ASSISTANT

## 2019-06-26 PROCEDURE — 94640 AIRWAY INHALATION TREATMENT: CPT

## 2019-06-26 PROCEDURE — 25000128 H RX IP 250 OP 636: Performed by: NURSE PRACTITIONER

## 2019-06-26 PROCEDURE — 87640 STAPH A DNA AMP PROBE: CPT | Performed by: INTERNAL MEDICINE

## 2019-06-26 PROCEDURE — 99223 1ST HOSP IP/OBS HIGH 75: CPT | Mod: AI | Performed by: INTERNAL MEDICINE

## 2019-06-26 PROCEDURE — 12000001 ZZH R&B MED SURG/OB UMMC

## 2019-06-26 PROCEDURE — 87641 MR-STAPH DNA AMP PROBE: CPT | Performed by: INTERNAL MEDICINE

## 2019-06-26 PROCEDURE — 99407 BEHAV CHNG SMOKING > 10 MIN: CPT

## 2019-06-26 PROCEDURE — 25000128 H RX IP 250 OP 636: Performed by: INTERNAL MEDICINE

## 2019-06-26 PROCEDURE — 27211427 ZZ H AEROBIKA WITH MANOMETER

## 2019-06-26 PROCEDURE — 25000131 ZZH RX MED GY IP 250 OP 636 PS 637: Performed by: PHYSICIAN ASSISTANT

## 2019-06-26 PROCEDURE — 25000128 H RX IP 250 OP 636: Performed by: PHYSICIAN ASSISTANT

## 2019-06-26 PROCEDURE — 40000802 ZZH SITE CHECK

## 2019-06-26 PROCEDURE — 40000275 ZZH STATISTIC RCP TIME EA 10 MIN

## 2019-06-26 PROCEDURE — 96376 TX/PRO/DX INJ SAME DRUG ADON: CPT

## 2019-06-26 PROCEDURE — 25000132 ZZH RX MED GY IP 250 OP 250 PS 637: Performed by: NURSE PRACTITIONER

## 2019-06-26 PROCEDURE — 94640 AIRWAY INHALATION TREATMENT: CPT | Mod: 76

## 2019-06-26 PROCEDURE — 36415 COLL VENOUS BLD VENIPUNCTURE: CPT | Performed by: NURSE PRACTITIONER

## 2019-06-26 PROCEDURE — 94799 UNLISTED PULMONARY SVC/PX: CPT

## 2019-06-26 PROCEDURE — 85027 COMPLETE CBC AUTOMATED: CPT | Performed by: NURSE PRACTITIONER

## 2019-06-26 RX ORDER — HYDRALAZINE HYDROCHLORIDE 20 MG/ML
20 INJECTION INTRAMUSCULAR; INTRAVENOUS ONCE
Status: COMPLETED | OUTPATIENT
Start: 2019-06-26 | End: 2019-06-26

## 2019-06-26 RX ORDER — NICOTINE 21 MG/24HR
1 PATCH, TRANSDERMAL 24 HOURS TRANSDERMAL DAILY
Status: DISCONTINUED | OUTPATIENT
Start: 2019-06-26 | End: 2019-06-29 | Stop reason: HOSPADM

## 2019-06-26 RX ORDER — IBUPROFEN 600 MG/1
600 TABLET, FILM COATED ORAL EVERY 6 HOURS PRN
Status: DISCONTINUED | OUTPATIENT
Start: 2019-06-26 | End: 2019-06-29 | Stop reason: HOSPADM

## 2019-06-26 RX ORDER — OXYCODONE HYDROCHLORIDE 5 MG/1
5 TABLET ORAL ONCE
Status: COMPLETED | OUTPATIENT
Start: 2019-06-26 | End: 2019-06-26

## 2019-06-26 RX ORDER — IPRATROPIUM BROMIDE AND ALBUTEROL SULFATE 2.5; .5 MG/3ML; MG/3ML
3 SOLUTION RESPIRATORY (INHALATION) EVERY 4 HOURS
Status: DISCONTINUED | OUTPATIENT
Start: 2019-06-26 | End: 2019-06-29 | Stop reason: HOSPADM

## 2019-06-26 RX ORDER — BENZONATATE 100 MG/1
100 CAPSULE ORAL 3 TIMES DAILY PRN
Status: DISCONTINUED | OUTPATIENT
Start: 2019-06-26 | End: 2019-06-29 | Stop reason: HOSPADM

## 2019-06-26 RX ADMIN — IPRATROPIUM BROMIDE AND ALBUTEROL SULFATE 3 ML: .5; 3 SOLUTION RESPIRATORY (INHALATION) at 23:41

## 2019-06-26 RX ADMIN — BENZOCAINE, MENTHOL 1 LOZENGE: 15; 3.6 LOZENGE ORAL at 22:19

## 2019-06-26 RX ADMIN — IPRATROPIUM BROMIDE AND ALBUTEROL SULFATE 3 ML: .5; 3 SOLUTION RESPIRATORY (INHALATION) at 16:13

## 2019-06-26 RX ADMIN — GUAIFENESIN 10 ML: 100 SOLUTION ORAL at 22:19

## 2019-06-26 RX ADMIN — CITALOPRAM HYDROBROMIDE 20 MG: 20 TABLET ORAL at 20:49

## 2019-06-26 RX ADMIN — NICOTINE POLACRILEX 4 MG: 4 GUM, CHEWING ORAL at 20:50

## 2019-06-26 RX ADMIN — CEFTRIAXONE 1 G: 1 INJECTION, POWDER, FOR SOLUTION INTRAMUSCULAR; INTRAVENOUS at 17:28

## 2019-06-26 RX ADMIN — NICOTINE POLACRILEX 4 MG: 4 GUM, CHEWING ORAL at 18:01

## 2019-06-26 RX ADMIN — OXYMETAZOLINE HYDROCHLORIDE 2 SPRAY: 0.05 SPRAY NASAL at 08:34

## 2019-06-26 RX ADMIN — IBUPROFEN 600 MG: 600 TABLET ORAL at 22:18

## 2019-06-26 RX ADMIN — BENZONATATE 100 MG: 100 CAPSULE ORAL at 22:19

## 2019-06-26 RX ADMIN — IPRATROPIUM BROMIDE AND ALBUTEROL SULFATE 3 ML: .5; 3 SOLUTION RESPIRATORY (INHALATION) at 19:24

## 2019-06-26 RX ADMIN — OXYCODONE HYDROCHLORIDE 5 MG: 5 TABLET ORAL at 09:20

## 2019-06-26 RX ADMIN — LOSARTAN POTASSIUM 50 MG: 50 TABLET, FILM COATED ORAL at 20:50

## 2019-06-26 RX ADMIN — PREDNISONE 40 MG: 20 TABLET ORAL at 08:34

## 2019-06-26 RX ADMIN — IPRATROPIUM BROMIDE AND ALBUTEROL SULFATE 3 ML: .5; 3 SOLUTION RESPIRATORY (INHALATION) at 11:34

## 2019-06-26 RX ADMIN — AZITHROMYCIN 250 MG: 250 TABLET, FILM COATED ORAL at 08:34

## 2019-06-26 RX ADMIN — HYDRALAZINE HYDROCHLORIDE 20 MG: 20 INJECTION INTRAMUSCULAR; INTRAVENOUS at 10:17

## 2019-06-26 RX ADMIN — ONDANSETRON 4 MG: 4 TABLET, ORALLY DISINTEGRATING ORAL at 07:27

## 2019-06-26 RX ADMIN — IPRATROPIUM BROMIDE AND ALBUTEROL SULFATE 3 ML: .5; 3 SOLUTION RESPIRATORY (INHALATION) at 07:35

## 2019-06-26 RX ADMIN — OXYMETAZOLINE HYDROCHLORIDE 2 SPRAY: 0.05 SPRAY NASAL at 20:49

## 2019-06-26 ASSESSMENT — PAIN DESCRIPTION - DESCRIPTORS: DESCRIPTORS: DULL;HEADACHE

## 2019-06-26 ASSESSMENT — ACTIVITIES OF DAILY LIVING (ADL): ADLS_ACUITY_SCORE: 11

## 2019-06-26 NOTE — PLAN OF CARE
Outpatient/Observation goals to be met before discharge home:     -diagnostic tests and consults completed and resulted -yes   -vital signs normal or at patient baseline -yes   -tolerating oral antibiotics or has plans for home infusion setup -yes   -infection is improving -yes   -dyspnea improved and O2 sats greater than 88% on room air or prior home oxygen levels -no, O2 sats 86% on RA at rest, on 2L   -returns to baseline functional status -no  -safe disposition plan has been identified -yes, prior living arrangement

## 2019-06-26 NOTE — PLAN OF CARE
Outpatient/Observation goals to be met before discharge home:     -diagnostic tests and consults completed and resulted: Met   -vital signs normal or at patient baseline: Met  -tolerating oral antibiotics or has plans for home infusion setup: Met   -infection is improving: Met   -dyspnea improved and O2 sats greater than 88% on room air or prior home oxygen levels: Not met- No SOB noted but pt still requires 2L NC. At rest pt sats at 86%.   -returns to baseline functional status: Not met  -safe disposition plan has been identified: Met, prior living arrangement

## 2019-06-26 NOTE — CONSULTS
"Smoking Cessation Consult   2019    Patient: Teetee Reynolds      :  1955                    MRN:[id number]      Teetee Reynolds is a 63 year old female with history notable for COPD and HTN who presents to the ED from clinic for 5 days of ongoing URI symptoms. Patient is current 1 pack per day smoker.     Asked patient if she would be interested in sharing about her tobacco use and in learning about more options and resources for quitting, staying quit, and in developing a relapse prevention plan, she agreed.   Patient willing to discuss current tobacco use. Patient has smoked for 28 years. She says she has had multiple quit attempts with the longest smoke free period being 5 years. She was able to quit on her own \"cold turkey\". Relapse occurred due to a significant stressor. Patient at first was reluctant to try NRT because she was strongly motivated to rid her body of nicotine. Provided education of nicotine on the continuum of risk as medicine compared with smoking tobacco.     Patient would like Nicotine Replacement Therapy while inpatient to help manage symptoms of withdrawal and cravings.     Patient states she is currently experiencing symptoms of withdrawal including irritability and crabbiness. She is says she is having strong cravings to smoke.     Patients main reason for smoking include: Patient states smoking made her very short of breath and she wants to stay healthy.    5 Top Reasons to quit smoking:     Types of Tobacco and Amount   Cigarettes 1 ppd   E-Cigs    Smokeless Tobacco    Cigars    Pipes    Waterpipes          Fagerstrom Test for Nicotine Dependence   How soon after waking do you smoke your first cigarette Within 5 minutes=3  5-30 minutes=2  31-60 minute=1 3             Do you find it difficult to refrain from smoking in places where it is forbidden? e.g. Sabianism, restaurants, etc? Yes=1  No=0   1        Which cigarette would you hate to give up? The first in the morning=1  Any other=0 1     " "   How many cigarettes a day do you smoke? 10 or less=0  11-20=1  21-30=2  31 or more=3 2   Do you smoke more frequently in the morning?   Yes=1  No=0 1   Do you smoke even if you are sick in bed most of the day? Yes=1  No=0 1                                                                                                                                                 Total Score 9   SCORE 1-2 = Low Dependence           3-4 = Low to Mod Dependence     5-7 = Moderate Dependence            8+ = High Dependence           Stage of Behavior Change:   Pre-contemplation - No intention    Contemplation - Change on the horizon    Preparation - Getting Ready X   Action - Consistently changed (within 6 months)    Maintenance - Staying quit (more than 6 months)    Relapse - Recycling            Patients Motivation to Quit Scale    Importance (1-10): 10   Confidence  (1-10): 10   Can Patient Imagine a Future without Smoking: Yes   Quit Attempt Date:  6/24/19   Final Quit Date:  TBD     Education/Recommendations    Education: Provided educational workbook, \"Quitting for Good with Treatment and Support\"    Recommendations: Encouraged patient to use workbook to help her understand why she smokes, come up with 5 reasons to quit, and to imagine what her future looks like without smoking.  - Instructed her to develop strategies to distract himself/herselff to get past cravings.  - Developed Smoking Cessation Relapse Prevention Plan.   - Would recommend discussion with PCP on addition of Zyban to her smoking cessation plan if NRT is insufficient.   -Use 21 mg patch daily for 4-6 weeks of smoking abstinence based on assessment of patients dependence level. Taper every 2-4 weeks in 7 mg steps as tolerated.   -Use4 mg gum, take first thing in the morning and/as needed for cravings and urges to smoke. My be used every 1-2 hours.       Time Spent: I spent 15 minutes with patient    Gave contact information and will call 48 hours after " discharge to provide support.    Ingris Hernandez, RRT, CTTS  Chronic Pulmonary Disease Specialist  Office: [phone]  Pager: [phone]

## 2019-06-26 NOTE — PLAN OF CARE
Outpatient/Observation goals to be met before discharge home:     -diagnostic tests and consults completed and resulted: Met   -vital signs normal or at patient baseline: Met  -tolerating oral antibiotics or has plans for home infusion setup: Met   -infection is improving: Met   -dyspnea improved and O2 sats greater than 88% on room air or prior home oxygen levels: Progressing- No SOB noted, decreased O2 to 1L and pt sats at 93%  -returns to baseline functional status: progressing  -safe disposition plan has been identified: Met, prior living arrangement

## 2019-06-26 NOTE — PROGRESS NOTES
ER attending addendum  Please see SAMIR H&P for full details.   I personally saw and examined pt and agree with the SAMIR's plan of care.       Pt is a 62 yo female with hx of COPD who was admitted to the observation unit for pneumonia and COPD exacerbation.  Pt CXR and labs improved.  This morning pt is still feeling tight and unwell. She says that her breathing does not feel improved.     Exam:  RRR  NAD  sats 92-93% on RA.   Diffuse end expiratory wheezes with prolonged expiratory phase.    A/p:  COPD exacerbation with pneumonia-pneumonia improved. Will continue nebs and steroids for COPD exacerbation. Pt being evaluated by COPD exacerbation team.   Will likely be here 1 additional night due to continued wheezes and SOB.     Signed:  Gudelia Ryan MD  June 26, 2019 at 10:50 AM

## 2019-06-26 NOTE — PROGRESS NOTES
Attempted to wean patient off O2. Patint unable to maintain O2 sats above 90 %. Patient O2 sats were 85% resting on room air. Patient placed back on 1L via nasal canula.

## 2019-06-26 NOTE — H&P
[facility], [place]    History and Physical - Hospitalist Service, Gold        Date of Admission:  6/24/2019      Acceptance Note  Teetee Reynolds is a 63 year old female with history significant for COPD and hypertension who presented to the ED on 6/24/2019 from clinic for 5 days of ongoing URI symptoms. She was found to be hypoxic in the ED with mild leukocytosis and chest X-ray concern for bibasilar infiltrates. She was given 2 DuoNeb , started on community acquired protocol and given ceftriaxone and Zithromax IV in the ER. Treated as ED observation for COPD exacerbation with mild underlying pneumonia and sinusitis. Now transferred inpatient for continued treatment with steroids and antibiotics.    Assessment & Plan   Teetee Reynolds is a 63 year old female with history significant for COPD and hypertension who presented with ongoing URI symptoms and SOB most likely from exacerbation of COPD.    # Acute exacerbation of COPD  # CAP  Presented with initial URI symptoms followed by productive cough, chest tightness and SOB. Found to be hypoxic in the ED(94% with 4L), mild leukocytosis ( WBC 11.7) and bibasilar reticular opacities on CXR. Most likely exacerbation of COPD 2/2 pneumonia. SOB to be less likely from cardiac problems given normal BNP, troponin and ECG showing only tachycardia. Patient reports improvement of symptoms since admission.   - Continue Azithromycin 250 mg PO daily  - Ceftriaxone 1 gm IV daily/could swithc to po in Am   - Ipratropium - albuterol Nebulizer  Every 4 hours  - Albuterol nebulizer PRN  - Prednisone 40 mg PO daily  - Walk test tomorrow for possible home o2   -MRSA swap to document clearance     # Hypertension  - Continue PTA Losartan 50 mg daily    # Depression, Anxiety  - Continue PTA Gabapentin, Citalopram and Trazodone    #Hyperglycemia  Blood glucose of 232. This could be from steroid given A1C of 5.1%    # Tobacco use   Patient has not smoked since 6/24. She is  motivated to stop smoking.  -Nicotine Patch daily started on 6/24         Diet: Regular Diet Adult    DVT Prophylaxis: Low Risk/Ambulatory with no VTE prophylaxis indicated  Dave Catheter: not present  Code Status: Full Code      Disposition Plan   Expected discharge: 2 - 3 days, recommended to prior living arrangement once O2 requirement established..     Merrill Rowan MD  Hospitalist/jory  [facility]    Departments of Medicine   Pager: [phone]      ______________________________________________________________________    Chief Complaint   Shortness of breath    History is obtained from the patient    History of Present Illness   Teetee Reynolds is a 63 year old female with PMH significant for COPD and hypertension who presented to the ED on 6/24 with 5 days history of worsening URI symptoms. She initially started to have eye pain with discharge, bilateral ear ache, subjective fever and later started to have productive cough associated with SOB and chest tightness. She was taking Ibuprofen with out any improvement with her symptoms. She has generalized fatigue and headache. She has nausea but no vomiting. No chest pain, palpitation or leg swelling. She has history of smoking, 28 pack year. She reports to have history of home oxygen for 1 month 2 years ago right after she was diagnosed with COPD. She states that she uses only albuterol nebulizer at home. No previous history of admission for COPD exacerbation.    She was found to be hypoxic in the ED with mild leukocytosis and chest X-ray concern for bibasilar infiltrates. She was given 2  DuoNeb Solu-Medrol , started on community acquired protocol and given ceftriaxone and Zithromax IV in the ER. Treated as ED observation for COPD exacerbation with mild underlying pneumonia and sinusitis.    Review of Systems    The 10 point Review of Systems is negative other than noted in the HPI or here.     Past Medical History    I  have reviewed this patient's medical history and updated it with pertinent information if needed.   Past Medical History:   Diagnosis Date     Anxiety      COPD (chronic obstructive pulmonary disease) (H)      Depressive disorder      Hypertension        Past Surgical History   I have reviewed this patient's surgical history and updated it with pertinent information if needed.  History reviewed. No pertinent surgical history.    Social History   I have reviewed this patient's social history and updated it with pertinent information if needed.  Social History     Tobacco Use     Smoking status: Current Every Day Smoker     Packs/day: 1.00     Years: 28.00     Pack years: 28.00     Types: Cigarettes     Start date: 1/1/1987   Substance Use Topics     Alcohol use: None     Drug use: None       Family History   I have reviewed this patient's family history and updated it with pertinent information if needed.   No family history on file.    Prior to Admission Medications   Prior to Admission Medications   Prescriptions Last Dose Informant Patient Reported? Taking?   albuterol (PROAIR HFA/PROVENTIL HFA/VENTOLIN HFA) 108 (90 Base) MCG/ACT inhaler 6/23/2019 at Unknown time  Yes Yes   Sig: Inhale 1-2 puffs into the lungs every 6 hours as needed for shortness of breath / dyspnea or wheezing   albuterol (PROVENTIL) (2.5 MG/3ML) 0.083% neb solution 6/24/2019 at AM  Yes Yes   Sig: Take 2.5 mg by nebulization 2 times daily as needed for shortness of breath / dyspnea or wheezing (Typically takes 1-2x daily)   aspirin (ASA) 325 MG EC tablet 6/23/2019 at Unknown time  Yes Yes   Sig: Take 325 mg by mouth every 6 hours as needed for moderate pain   citalopram (CELEXA) 20 MG tablet 6/21/2019 at Unknown time  Yes Yes   Sig: Take 20 mg by mouth daily   gabapentin (NEURONTIN) 100 MG capsule 6/21/2019 at Unknown time  Yes Yes   Sig: Take 100 mg by mouth 3 times daily as needed Take 1-3 capsules by mouth daily as needed.   ibuprofen  (ADVIL/MOTRIN) 200 MG tablet 6/23/2019 at Unknown time  Yes Yes   Sig: Take 200 mg by mouth every 4 hours as needed for mild pain   losartan (COZAAR) 50 MG tablet 6/21/2019 at Unknown time  Yes Yes   Sig: Take 50 mg by mouth daily   traZODone (DESYREL) 150 MG tablet 6/23/2019 at PM  Yes Yes   Sig: Take 150 mg by mouth nightly as needed for sleep      Facility-Administered Medications: None     Allergies   Allergies   Allergen Reactions     Lisinopril Cough       Physical Exam   Vital Signs: Temp: 98.4  F (36.9  C) Temp src: Oral BP: 116/85 Pulse: 91 Heart Rate: 87 Resp: 16 SpO2: 98 % O2 Device: Nasal cannula Oxygen Delivery: 2 LPM  Weight: 139 lbs 3.2 oz    Constitutional: awake, alert, cooperative, no apparent distress  Eyes: pink conjuctivae and non icteric sclera  Respiratory: No increased work of breathing, good air exchange, clear to auscultation bilaterally, no crackles or wheezing  Cardiovascular: Normal apical impulse, regular rate and rhythm, normal S1 and S2, no S3 or S4, and no murmur noted  GI:normal bowel sounds, soft, non-distended, non-tender, no masses palpated  ; no CVAT   Musculoskeletal: no lower extremity pitting edema present  Neurologic: Awake, alert, oriented to name, place and time.     Data   Data reviewed today: I reviewed all medications, new labs and imaging results over the last 24 hours. I personally reviewed no images or EKG's today.    Recent Labs   Lab 06/26/19  1054 06/25/19  0516 06/24/19  1656   WBC 14.4* 8.9 11.7*   HGB 13.1 12.1 13.2   * 104* 101*    295 297   NA  --  136 136   POTASSIUM  --  3.9 3.9   CHLORIDE  --  102 102   CO2  --  29 26   BUN  --  11 8   CR  --  0.43* 0.44*   ANIONGAP  --  4 8   SAL  --  8.9 9.0   GLC  --  232* 115*   ALBUMIN  --   --  3.2*   PROTTOTAL  --   --  7.1   BILITOTAL  --   --  0.5   ALKPHOS  --   --  87   ALT  --   --  28   AST  --   --  18   TROPI  --   --  <0.015

## 2019-06-26 NOTE — PLAN OF CARE
Outpatient/Observation goals to be met before discharge home:      -diagnostic tests and consults completed and resulted: Pending.   -vital signs normal or at patient baseline: Met  -tolerating oral antibiotics or has plans for home infusion setup: Met   -infection is improving: Met   -dyspnea improved and O2 sats greater than 88% on room air or prior home oxygen levels: Progressing- No SOB noted, decreased O2 to 1L and pt sats at 93%  -returns to baseline functional status: progressing  -safe disposition plan has been identified: Met, prior living arrangement   Will attempt to wean off O2.

## 2019-06-26 NOTE — CONSULTS
"Chronic Pulmonary Disease Specialist Consult   COPD Initial Interview    2019    Patient: Teetee Reynolds      :  1955                    MRN:[id number]      Reason for Consult:  Patient with COPD (albeit no PFT record noted), admitted with possible exacerbation 2/2 community acquired pneumonia.   Consulted by primary, treatment team to assist with therapy optimization.     History of Present Illness: Ms. Reynolds is a 64 yo female with hx of COPD (albeit no PFT record found) who was admitted to the observation unit for pneumonia and COPD exacerbation.       Patient remembers being diagnosed with COPD during a hospital admission for cervical fusion back in . Her PCP at [facility], Dr. José Miguel Milan, has since managed her COPD. Patient states she was ordered on Advair Diskus BID but had not used it as prescribed. Reports regularly, at least twice daily, to using Albuterol neb solution. Reports she has not yet seen a pulmonologist or had done any pulmonary function tests. No home supplemental 02 yet. States she's symptom free and functionally not restricted for the most part. Reports coughing all of the time. States she's smoked up until this past Monday when she couldn't breathe or walk. Expressed desire to quit smoking \"for good.\" Met with my  Colleague, Ingris Hernandez, RRT and Certified Tobacco Specialist, see her consult note.     No PFT record found.     Home respiratory medications include:  --ADVAIR DISKUS 250-50 MCG/DOSE inhaler INHALE ONE PUFF BY MOUTH TWICE A DAY   --Albuterol 2.5 mg/3 mL, 0.083%, (PROVENTIL) nebulizer solution NEBULIZE AND INHALE 1 VIAL BY MOUTH 4 TIMES DAILY   --ALBUterol sulfate HFA (VENTOLIN HFA) 108 (90 Base) MCG/ACT inhaler Inhale 2 Puffs q4prn      Assessment:  Patient resting in bed when visited. Sp02 88% on RA; HR 99, RR 20 and not labored. Exhibiting strong, congested, productive cough. BS=fine crackles in the lower lung fields, otherwise overall diminished. "     Action:    -Evaluated patients inspiratory strength using In-Check device: Patient able to generate sufficient inspiratory flow for adequate drug deposition. Initially generated excessive insp flow; educated on the proper/sufficient insp flow needed for optimal drug deposition.    -Evaluated patients coordination and technique with inhaler: Patient does demonstrate good technique with inhaler. Aerochamber provided; stressed importance of always having it inline with inhaler.     -Patient able to generate a pressure of 10cm H2O on Aerobika OPEP device for 2 seconds, generating strong, productive cough. The goal for each breath, for a total of 3 sets of 10 breaths, is to exhale at 10-20 of pressure, over 3-4 seconds without fatigue.       Recommendations:  -Continue with current inpatient respiratory medication schedule.     -Establish care with a Pulmonologist and get complete PFT's; patient has not done this yet. This is important for disease staging and medication tailoring.     -Use Aerobika Oscillating PEP Device for 3 sets of 10 breaths two times daily. Perform 2 to 3 'sherwood coughs'  to clear airway after each set. May use device with or without nebs. The use of this device will help open smaller airways, improve mucus clearance, decrease cough frequency, and improve exercise tolerance    -Use Aerochamber with MDI's for better drug deposition.    -Patient needs continued reinforcement and continued education on inhaler use and breath recovery techniques    -21 mg Nicotine Patch to help reduce symptoms of withdrawal and cravings. Please see note from Ingris Hernandez for smoking cassation details. 4mg Nicotine Lozenges/Gum for cravings and urges.    -Smoking Cessation Counseling and Relapse Prevention, see above.    -Referral for outpatient pulmonary Rehab    -Patient reports she has a nebulizer machine at home.     -Home Oxygen Assessment (Walk test) 24-48 hours prior to discharge to determine O2 needs at rest  and with exertion/activity    -At discharge, continue with patient's home regimen of respiratory medications. Recommend adding Incruse Ellipta 62.5mcg one Daily. Did test claim with our discharge pharmacy; it's covered with $24 monthly co-pay. Provided brochure for 1-month Free coupon and subsequent $10 monthly program.        Will continue to follow and support patient as needed. Will follow up with phone call 48 hours after discharge.     I spent 45 minutes with the patient.    Wilfrid Callahan, RRT  Chronic Pulmonary Disease Specialist  Cardiopulmonary Services   Office: [phone]

## 2019-06-26 NOTE — PROGRESS NOTES
Outpatient/Observation goals to be met before discharge home:      -diagnostic tests and consults completed and resulted: Pending.  -vital signs normal or at patient baseline: O2 sats are below 88% on room air. B/P elevated. Hydralazine given. Will continue to monitor.   -tolerating oral antibiotics or has plans for home infusion setup: Met   -infection is improving: Met   -dyspnea improved and O2 sats greater than 88% on room air or prior home oxygen levels: Progressing- Will preform walk test.   -returns to baseline functional status: progressing  -safe disposition plan has been identified: Met, prior living arrangement

## 2019-06-26 NOTE — PLAN OF CARE
Patient has been assessed for Home Oxygen needs. Oxygen readings:    *Pulse oximetry (SpO2) = 88 % on room air at rest while awake.    *SpO2 improved to 95 % on 2 liters/minute at rest.    *SpO2 = 84 % on room air during activity/with exercise.    *SpO2 improved to 92 % on 2 liters/minute during activity/with exercise.

## 2019-06-26 NOTE — PROGRESS NOTES
Care Coordinator - Discharge Planning    Admission Date/Time:  6/24/2019  Attending MD:  Guicho Larios*     Data  Date of initial CC assessment: 6/25/19  Chart reviewed, discussed with interdisciplinary team.   Patient was admitted for:   1. COPD exacerbation (H)    2. Pneumonia of both lower lobes due to infectious organism (H)    3. Acute non-recurrent sinusitis, unspecified location         Assessment   Concerns with insurance coverage for discharge needs: None.  Current Living Situation: Patient lives alone.  Support System: Supportive and Involved  Services Involved: DME and Home neb machine  Transportation at Discharge: Car and Family or friend will provide  Transportation to Medical Appointments:    - Name of caregiver: Self  Barriers to Discharge: Medical stability.  Wean off oxygen    RNCC consulted to assist with arranging PCP f/u and possible need for home care.   Pt status reviewed during care team rounds.  COPD RT Care Coordinator consulted.  Pt has been able to wean off oxygen.  Anticipate pt may be discharge home later today.    Met with pt.  Introduced RNCC role.  Pt notes no concerns or needs at this time.  Per pt she has a friend that will be able to transport her home.  Agreed to f/u with pt should discharge needs be noted.        Notified by Marcia COPD RT Care Coordinator that pt would benefit from outpatient pulmonary follow up.  Patient may also require home oxygen set up when cleared for discharge.  Per discussion with GALLITO Vivas anticipate that pt will discharge tomorrow.  Continue to attempt to wean oxygen. Reviewed with [employer] [facility] pending need for home oxygen.        Met with pt.  Reviewed above information.  Pt would prefer to f/u with a pulmonologist within the [facility].  Pt confirmed that she will make the appointment when cleared for discharge.   Reviewed pending need for home oxygen.  Pt noted no concerns regarding possible need for home oxygen setup with  portability.  Agreed to f/u with pt when cleared for discharge.     Plan  Anticipated Discharge Date:  6/26/19  Anticipated Discharge Plan:  Home  Nicki Babb, RN BSN, PHN RN Care Coordinator  Internal Medicine   [phone]  Pager: [phone]  [facility] RN Care Coordinator job code * * * [phone]  6/26/2019 9:24 AM

## 2019-06-26 NOTE — PROGRESS NOTES
"Observation Unit Transfer Summary     Patient ID:  Teetee Reynolds  MRN: [id number]  63 year old  YOB: 1955    Observation Admit Date: 6/24/2019    ED Admitting Attending: Guicho Larios MD    Transfer Date and Time: June 26, 2019 at 3:27 PM     Transferring Observation Provider: CHRISTINE Kim CNP    Admission Diagnoses:     1. COPD exacerbation (H)    2. Pneumonia of both lower lobes due to infectious organism (H)    3. Acute non-recurrent sinusitis, unspecified location        Transfer Diagnoses:    COPD exacerbation/CAP    Emergency Department and Observation Course:     Teetee Reynolds is a 63 year old female with history notable for COPD and HTN who presents to the ED from clinic for 5 days of ongoing URI symptoms.      1. COPD exacerbation/CAP: URI symptoms x 5 days.In the ED, T 100.7 F, Afebrile while on OBs. , /91 , Yesterday, requiring O2 4L nasal cannula (denies home oxygen use). Elevated WBC.. Trop x 1 negative. CXR shows \"retrocardiac opacities which could represent infection or atelectasis. Bibasilar and superior left lower lobe reticular opacities which may represent atypical infection versus pulmonary edema and trace left pleural effusion. She has been on ceftriaxone and azithromycin in the observation unit as well as duobebs and prednisone.  O2 sats 92% on 1L nasal cannula, dropped to 88% on room air.  Patient reports feeling worse, with continued coughing. Reports sinus headache.   Chest x-ray yesterday completed and showed  improved retrocardiac and left basilar opacities, possibly mild venous congestion. She has no PFTs in our system or Care Everywhere. She was seen by the COPD RT specialists who recommended smoking cessation and follow up with pulmonology and need for PFTS outpatient. Walk test completed and patient desating to 84% on RA. Will repeat tomorrow am to see if patient is improving.   -continue rocephin, azithromycin  -duonebs Q 4 hours, prn Albuterol nebs. "   -wean O2, keep O2 sats>88% on room air  -continue prednisone 40 mg po daily  -afrin nasal spray, Tessalon perils, Robitussin cough syrup, Throat lozenges.   -needs refill of albuterol inhaler at discharge, has nebs at home  -started Nicotine patches and nicotine gum.      2. HTN: /94 overnight   -Continue with Losartan 50 mg at hs, prn Hydralazine x 1. Repeat BP check 109/68.   -Asprin 325 mg daily       3. Depression/anxiety:   -Continue with Gabapentin, Citalopram and Trazodone      4. Hyperglycemia:  Last glucose check yesterday am and was 232, A1C 5.1.  Likely secondary to steroid use.  -  BG checks BID     At this time the patient has failed observation management due to continued O2 requirement and will be transferred to inpatient status.    Consults: COPD RT specialists.    DATA:    Transfer Exam:    /68   Pulse 80   Temp 98.6  F (37  C) (Oral)   Resp 18   Wt 63.1 kg (139 lb 3.2 oz)   SpO2 91%   GENERAL: Alert and oriented x 3. NAD.   HEENT: Anicteric sclera. Mucous membranes moist. Sinus tenderness throughout  CV: RRR. S1, S2. No murmurs appreciated.   RESPIRATORY: Effort normal. Diffuse end expiratory wheezes   GI: Abdomen soft and non distended with normoactive bowel sounds present in all quadrants. No tenderness, rebound, guarding.   NEUROLOGICAL: No focal deficits. Moves all extremities.    EXTREMITIES: No peripheral edema. Intact bilateral pedal pulses.   SKIN: No jaundice. No rashes.    Current Medications:    No current outpatient medications on file.       Medications Prior to Admission:    Medications Prior to Admission   Medication Sig Dispense Refill Last Dose     albuterol (PROAIR HFA/PROVENTIL HFA/VENTOLIN HFA) 108 (90 Base) MCG/ACT inhaler Inhale 1-2 puffs into the lungs every 6 hours as needed for shortness of breath / dyspnea or wheezing   6/23/2019 at Unknown time     albuterol (PROVENTIL) (2.5 MG/3ML) 0.083% neb solution Take 2.5 mg by nebulization 2 times daily as needed  for shortness of breath / dyspnea or wheezing (Typically takes 1-2x daily)   6/24/2019 at AM     aspirin (ASA) 325 MG EC tablet Take 325 mg by mouth every 6 hours as needed for moderate pain   6/23/2019 at Unknown time     citalopram (CELEXA) 20 MG tablet Take 20 mg by mouth daily   6/21/2019 at Unknown time     gabapentin (NEURONTIN) 100 MG capsule Take 100 mg by mouth 3 times daily as needed Take 1-3 capsules by mouth daily as needed.   6/21/2019 at Unknown time     ibuprofen (ADVIL/MOTRIN) 200 MG tablet Take 200 mg by mouth every 4 hours as needed for mild pain   6/23/2019 at Unknown time     losartan (COZAAR) 50 MG tablet Take 50 mg by mouth daily   6/21/2019 at Unknown time     traZODone (DESYREL) 150 MG tablet Take 150 mg by mouth nightly as needed for sleep   6/23/2019 at PM       Significant Diagnostic Studies:     Results for orders placed or performed during the hospital encounter of 06/24/19   XR Chest Port 1 View    Narrative    XR CHEST PORT 1 VW  6/24/2019 5:27 PM      HISTORY: fever cough hypoxia    COMPARISON: None    FINDINGS:   Single] AP view of the chest. Partial visualization of cervical fusion  hardware.    Trachea is midline. Cardiomediastinal silhouette within normal limits.  Pulmonary vasculature is distinct. Bibasilar and upper left lower lobe  reticular opacities. Retrocardiac opacities. Trace left pleural  effusion. No pneumothorax.    Visualized portions of the abdomen, soft tissues and osseous thorax is  unremarkable.      Impression    IMPRESSION:   1. Retrocardiac opacities which could represent infection or  atelectasis.  2. Bibasilar and superior left lower lobe reticular opacities which  may represent atypical infection versus pulmonary edema.  3. Trace left pleural effusion.    I have personally reviewed the examination and initial interpretation  and I agree with the findings.    AUGUSTA MALDONADO MD   XR Chest 2 Views    Narrative    Exam: XR CHEST 2 VW, 6/25/2019 8:12  AM    Indication: Possible LLL pneumonia    Comparison: 6/24/2019    Findings:   PA and lateral views of the chest. Partially visualized cervical  fusion hardware. Presumed nasal cannula oxygen tube overlies the film.  Cardiomediastinal silhouette is not enlarged. Improved retrocardiac  and basilar opacities. Possibly some mild venous congestion. No  pneumothorax. No large pleural effusion. Visualized upper abdomen is  unremarkable. Mild convex right curvature of the midthoracic spine. No  acute osseous abnormalities.      Impression    Impression: Improved retrocardiac and left basilar opacities. Possibly  some mild venous congestion.    I have personally reviewed the examination and initial interpretation  and I agree with the findings.    FINA RIVERA MD   CBC with platelets differential   Result Value Ref Range    WBC 11.7 (H) 4.0 - 11.0 10e9/L    RBC Count 4.03 3.8 - 5.2 10e12/L    Hemoglobin 13.2 11.7 - 15.7 g/dL    Hematocrit 40.8 35.0 - 47.0 %     (H) 78 - 100 fl    MCH 32.8 26.5 - 33.0 pg    MCHC 32.4 31.5 - 36.5 g/dL    RDW 12.7 10.0 - 15.0 %    Platelet Count 297 150 - 450 10e9/L    Diff Method Automated Method     % Neutrophils 78.8 %    % Lymphocytes 5.7 %    % Monocytes 14.7 %    % Eosinophils 0.1 %    % Basophils 0.4 %    % Immature Granulocytes 0.3 %    Nucleated RBCs 0 0 /100    Absolute Neutrophil 9.2 (H) 1.6 - 8.3 10e9/L    Absolute Lymphocytes 0.7 (L) 0.8 - 5.3 10e9/L    Absolute Monocytes 1.7 (H) 0.0 - 1.3 10e9/L    Absolute Eosinophils 0.0 0.0 - 0.7 10e9/L    Absolute Basophils 0.1 0.0 - 0.2 10e9/L    Abs Immature Granulocytes 0.0 0 - 0.4 10e9/L    Absolute Nucleated RBC 0.0    Comprehensive metabolic panel   Result Value Ref Range    Sodium 136 133 - 144 mmol/L    Potassium 3.9 3.4 - 5.3 mmol/L    Chloride 102 94 - 109 mmol/L    Carbon Dioxide 26 20 - 32 mmol/L    Anion Gap 8 3 - 14 mmol/L    Glucose 115 (H) 70 - 99 mg/dL    Urea Nitrogen 8 7 - 30 mg/dL    Creatinine 0.44 (L) 0.52 -  1.04 mg/dL    GFR Estimate >90 >60 mL/min/[1.73_m2]    GFR Estimate If Black >90 >60 mL/min/[1.73_m2]    Calcium 9.0 8.5 - 10.1 mg/dL    Bilirubin Total 0.5 0.2 - 1.3 mg/dL    Albumin 3.2 (L) 3.4 - 5.0 g/dL    Protein Total 7.1 6.8 - 8.8 g/dL    Alkaline Phosphatase 87 40 - 150 U/L    ALT 28 0 - 50 U/L    AST 18 0 - 45 U/L   Lactic acid   Result Value Ref Range    Lactic Acid 1.1 0.7 - 2.0 mmol/L   Nt probnp inpatient   Result Value Ref Range    N-Terminal Pro BNP Inpatient 450 0 - 900 pg/mL   Troponin I   Result Value Ref Range    Troponin I ES <0.015 0.000 - 0.045 ug/L   Procalcitonin   Result Value Ref Range    Procalcitonin 0.32 ng/ml   Legionella pneumonia antigen urine   Result Value Ref Range    Specimen Description Urine     L Pneumo Urine Antigen       Presumptive negative for Legionella pneumophilia serogroup 1 antigen in urine, suggesting   no recent or current infection.  Infection due to Legionella cannot be ruled out, since   other serogroups and species may cause disease, antigen may not be present in urine in   early infection, and the level of antigen present in the urine may be below detectable   limits of the test.     Basic metabolic panel   Result Value Ref Range    Sodium 136 133 - 144 mmol/L    Potassium 3.9 3.4 - 5.3 mmol/L    Chloride 102 94 - 109 mmol/L    Carbon Dioxide 29 20 - 32 mmol/L    Anion Gap 4 3 - 14 mmol/L    Glucose 232 (H) 70 - 99 mg/dL    Urea Nitrogen 11 7 - 30 mg/dL    Creatinine 0.43 (L) 0.52 - 1.04 mg/dL    GFR Estimate >90 >60 mL/min/[1.73_m2]    GFR Estimate If Black >90 >60 mL/min/[1.73_m2]    Calcium 8.9 8.5 - 10.1 mg/dL   CBC with platelets differential   Result Value Ref Range    WBC 8.9 4.0 - 11.0 10e9/L    RBC Count 3.75 (L) 3.8 - 5.2 10e12/L    Hemoglobin 12.1 11.7 - 15.7 g/dL    Hematocrit 38.8 35.0 - 47.0 %     (H) 78 - 100 fl    MCH 32.3 26.5 - 33.0 pg    MCHC 31.2 (L) 31.5 - 36.5 g/dL    RDW 12.6 10.0 - 15.0 %    Platelet Count 295 150 - 450 10e9/L     Diff Method Automated Method     % Neutrophils 86.0 %    % Lymphocytes 5.3 %    % Monocytes 8.0 %    % Eosinophils 0.0 %    % Basophils 0.2 %    % Immature Granulocytes 0.5 %    Nucleated RBCs 0 0 /100    Absolute Neutrophil 7.6 1.6 - 8.3 10e9/L    Absolute Lymphocytes 0.5 (L) 0.8 - 5.3 10e9/L    Absolute Monocytes 0.7 0.0 - 1.3 10e9/L    Absolute Eosinophils 0.0 0.0 - 0.7 10e9/L    Absolute Basophils 0.0 0.0 - 0.2 10e9/L    Abs Immature Granulocytes 0.0 0 - 0.4 10e9/L    Absolute Nucleated RBC 0.0    Hemoglobin A1c   Result Value Ref Range    Hemoglobin A1C 5.1 0 - 5.6 %   CBC with platelets   Result Value Ref Range    WBC 14.4 (H) 4.0 - 11.0 10e9/L    RBC Count 4.09 3.8 - 5.2 10e12/L    Hemoglobin 13.1 11.7 - 15.7 g/dL    Hematocrit 41.2 35.0 - 47.0 %     (H) 78 - 100 fl    MCH 32.0 26.5 - 33.0 pg    MCHC 31.8 31.5 - 36.5 g/dL    RDW 12.6 10.0 - 15.0 %    Platelet Count 394 150 - 450 10e9/L   EKG 12 lead   Result Value Ref Range    Interpretation ECG Click View Image link to view waveform and result    RT Chronic Pulmonary Disease Specialist Consult    Narrative    Wilfrid Callahan, RT     2019  2:58 PM  Chronic Pulmonary Disease Specialist Consult   COPD Initial Interview    2019    Patient: Teetee Reynolds      :  1955                      MRN:[id number]      Reason for Consult:  Patient with COPD (albeit no PFT record   noted), admitted with possible exacerbation 2/2 community   acquired pneumonia.   Consulted by primary, treatment team to   assist with therapy optimization.     History of Present Illness: Ms. Reynolds is a 62 yo female with hx of   COPD (albeit no PFT record found) who was admitted to the   observation unit for pneumonia and COPD exacerbation.       Patient remembers being diagnosed with COPD during a hospital   admission for cervical fusion back in . Her PCP at   [facility], Dr. José Miguel Milan, has since managed her COPD.   Patient states she was ordered on  "Advair Diskus BID but had not   used it as prescribed. Reports regularly, at least twice daily,   to using Albuterol neb solution. Reports she has not yet seen a   pulmonologist or had done any pulmonary function tests. No home   supplemental 02 yet. States she's symptom free and functionally   not restricted for the most part. Reports coughing all of the   time. States she's smoked up until this past Monday when she   couldn't breathe or walk. Expressed desire to quit smoking \"for   good.\" Met with my  Colleague, Ingris Hernandez, RRT and Certified   Tobacco Specialist, see her consult note.     No PFT record found.     Home respiratory medications include:  --ADVAIR DISKUS 250-50 MCG/DOSE inhaler INHALE ONE PUFF BY MOUTH   TWICE A DAY   --Albuterol 2.5 mg/3 mL, 0.083%, (PROVENTIL) nebulizer solution   NEBULIZE AND INHALE 1 VIAL BY MOUTH 4 TIMES DAILY   --ALBUterol sulfate HFA (VENTOLIN HFA) 108 (90 Base) MCG/ACT   inhaler Inhale 2 Puffs q4prn      Assessment:  Patient resting in bed when visited. Sp02 88% on RA;   HR 99, RR 20 and not labored. Exhibiting strong, congested,   productive cough. BS=fine crackles in the lower lung fields,   otherwise overall diminished.     Action:    -Evaluated patients inspiratory strength using In-Check device:   Patient able to generate sufficient inspiratory flow for adequate   drug deposition. Initially generated excessive insp flow;   educated on the proper/sufficient insp flow needed for optimal   drug deposition.    -Evaluated patients coordination and technique with inhaler:   Patient does demonstrates good technique with inhaler.   Aerochamber provided; stressed importance of always having it   inline with inhaler.     -Patient able to generate a pressure of 10cm H2O on Aerobika OPEP   device for 2 seconds, generating strong, productive cough. The   goal for each breath, for a total of 3 sets of 10 breaths, is to   exhale at 10-20 of pressure, over 3-4 seconds without " fatigue.       Recommendations:  -Continue with current inpatient respiratory medication schedule.       -Establish care with a Pulmonologist and get complete PFT's;   patient has not done this yet. This is important for disease   staging and medication tailoring.     -Use Aerobika Oscillating PEP Device for 3 sets of 10 breaths two   times daily. Perform 2 to 3 'sherwood coughs'  to clear airway after   each set. May use device with or without nebs. The use of this   device will help open smaller airways, improve mucus clearance,   decrease cough frequency, and improve exercise tolerance    -Use Aerochamber with MDI's for better drug deposition.    -Patient needs continued reinforcement and continued education on   inhaler use and breath recovery techniques    -21 mg Nicotine Patch to help reduce symptoms of withdrawal and   cravings. Please see note from Ingris Hernandez smoking cassation   details. 4mg Nicotine Lozenges/Gum for cravings and urges.    -Smoking Cessation Counseling and Relapse Prevention, see above.    -Referral for outpatient pulmonary Rehab    -Patient reports she has a nebulizer machine at home.     -Home Oxygen Assessment (Walk test) 24-48 hours prior to   discharge to determine O2 needs at rest and with   exertion/activity    -Patient is good candidate for COPD Action Plan due to high   readmission risk    -At discharge, continue with patient's home regimen of   respiratory medications. Recommend adding Incruse Ellipta 62.5mcg   one Daily. Did test claim with our discharge pharmacy; it's   covered with $24 monthly co-pay. Provided brochure for 1-month   Free coupon and subsequent $10 monthly program.        Will continue to follow and support patient as needed. Will   follow up with phone call 48 hours after discharge.     I spent 45 minutes with the patient.    Wilfrid Callahan, RRT  Chronic Pulmonary Disease Specialist  Cardiopulmonary Services   Office: [phone]         Smoking Cessation  "Program IP Consult    Narrative    Ingris Hernandez, RT     2019 12:28 PM  Smoking Cessation Consult   2019    Patient: Teetee Reynolds      :  1955                      MRN:[id number]      Teetee Reynolds is a 63 year old female with history notable for COPD   and HTN who presents to the ED from clinic for 5 days of ongoing   URI symptoms. Patient is current 1 pack per day smoker.     Asked patient if she would be interested in sharing about her   tobacco use and in learning about more options and resources for   quitting, staying quit, and in developing a relapse prevention   plan, she agreed.   Patient willing to discuss current tobacco use. Patient has   smoked for 28 years. She says she has had multiple quit attempts   with the longest smoke free period being 5 years. She was able to   quit on her own \"cold turkey\". Relapse occurred due to a   significant stressor. Patient at first was reluctant to try NRT   because she was strongly motivated to rid her body of nicotine.   Provided education of nicotine on the continuum of risk as   medicine compared with smoking tobacco.     Patient would like Nicotine Replacement Therapy while inpatient   to help manage symptoms of withdrawal and cravings.     Patient states she is currently experiencing symptoms of   withdrawal including irritability and crabbiness. She is says she   is having strong cravings to smoke.     Patients main reason for smoking include: Patient states smoking   made her very short of breath and she wants to stay healthy.    5 Top Reasons to quit smoking:     Types of Tobacco and Amount   Cigarettes 1 ppd   E-Cigs    Smokeless Tobacco    Cigars    Pipes    Waterpipes          Fagerstrom Test for Nicotine Dependence   How soon after waking do you smoke your first cigarette Within 5   minutes=3  5-30 minutes=2  31-60 minute=1 3             Do you find it difficult to refrain from smoking in places where   it is forbidden? e.g. Protestant, " "restaurants, etc? Yes=1  No=0   1        Which cigarette would you hate to give up? The first in the   morning=1  Any other=0 1        How many cigarettes a day do you smoke? 10 or less=0  11-20=1  21-30=2  31 or more=3 2   Do you smoke more frequently in the morning?   Yes=1  No=0 1   Do you smoke even if you are sick in bed most of the day? Yes=1  No=0 1                                                                                                                                                     Total Score 9   SCORE 1-2 = Low Dependence           3-4 = Low to Mod Dependence       5-7 = Moderate Dependence            8+ = High Dependence           Stage of Behavior Change:   Pre-contemplation - No intention    Contemplation - Change on the horizon    Preparation - Getting Ready X   Action - Consistently changed (within 6 months)    Maintenance - Staying quit (more than 6 months)    Relapse - Recycling            Patients Motivation to Quit Scale    Importance (1-10): 10   Confidence  (1-10): 10   Can Patient Imagine a Future without Smoking: Yes   Quit Attempt Date:  6/24/19   Final Quit Date:  TBD     Education/Recommendations    Education: Provided educational workbook, \"Quitting for Good with   Treatment and Support\"    Recommendations: Encouraged patient to use workbook to help her   understand why she smokes, come up with 5 reasons to quit, and to   imagine what her future looks like without smoking.  - Instructed her to develop strategies to distract   himself/herselff to get past cravings.  - Developed Smoking Cessation Relapse Prevention Plan.   - Would recommend discussion with PCP on addition of Zyban to her   smoking cessation plan if NRT is insufficient.   -Use 21 mg patch daily for 4-6 weeks of smoking abstinence based   on assessment of patients dependence level. Taper every 2-4 weeks   in 7 mg steps as tolerated.   -Use4 mg gum, take first thing in the morning and/as needed for   cravings and " urges to smoke. My be used every 1-2 hours.       Time Spent: I spent 15 minutes with patient    Gave contact information and will call 48 hours after discharge   to provide support.    Ingris Hernandez, RRT, CTTS  Chronic Pulmonary Disease Specialist  Office: [phone]  Pager: [phone]           Blood Culture ONE site   Result Value Ref Range    Specimen Description Blood Right Arm     Special Requests Received in aerobic bottle only     Culture Micro No growth after 2 days        Signed:  Amelia Joe  June 26, 2019 at 3:27 PM

## 2019-06-27 LAB
ANION GAP SERPL CALCULATED.3IONS-SCNC: 5 MMOL/L (ref 3–14)
BUN SERPL-MCNC: 14 MG/DL (ref 7–30)
CALCIUM SERPL-MCNC: 9 MG/DL (ref 8.5–10.1)
CHLORIDE SERPL-SCNC: 103 MMOL/L (ref 94–109)
CO2 SERPL-SCNC: 29 MMOL/L (ref 20–32)
CREAT SERPL-MCNC: 0.51 MG/DL (ref 0.52–1.04)
ERYTHROCYTE [DISTWIDTH] IN BLOOD BY AUTOMATED COUNT: 12.5 % (ref 10–15)
GFR SERPL CREATININE-BSD FRML MDRD: >90 ML/MIN/{1.73_M2}
GLUCOSE SERPL-MCNC: 98 MG/DL (ref 70–99)
HCT VFR BLD AUTO: 41.4 % (ref 35–47)
HGB BLD-MCNC: 13.2 G/DL (ref 11.7–15.7)
LACTATE BLD-SCNC: 0.8 MMOL/L (ref 0.7–2)
MCH RBC QN AUTO: 32.1 PG (ref 26.5–33)
MCHC RBC AUTO-ENTMCNC: 31.9 G/DL (ref 31.5–36.5)
MCV RBC AUTO: 101 FL (ref 78–100)
MRSA DNA SPEC QL NAA+PROBE: NEGATIVE
PLATELET # BLD AUTO: 411 10E9/L (ref 150–450)
POTASSIUM SERPL-SCNC: 3.6 MMOL/L (ref 3.4–5.3)
RBC # BLD AUTO: 4.11 10E12/L (ref 3.8–5.2)
SODIUM SERPL-SCNC: 138 MMOL/L (ref 133–144)
SPECIMEN SOURCE: NORMAL
WBC # BLD AUTO: 11.6 10E9/L (ref 4–11)

## 2019-06-27 PROCEDURE — 25000132 ZZH RX MED GY IP 250 OP 250 PS 637: Performed by: PHYSICIAN ASSISTANT

## 2019-06-27 PROCEDURE — 83605 ASSAY OF LACTIC ACID: CPT | Performed by: INTERNAL MEDICINE

## 2019-06-27 PROCEDURE — 99232 SBSQ HOSP IP/OBS MODERATE 35: CPT | Performed by: INTERNAL MEDICINE

## 2019-06-27 PROCEDURE — 40000989 ZZH STATISTIC CHRONIC PULMONARY DISEASE SPECIALIST

## 2019-06-27 PROCEDURE — 27211427 ZZ H AEROBIKA WITH MANOMETER

## 2019-06-27 PROCEDURE — 25000132 ZZH RX MED GY IP 250 OP 250 PS 637: Performed by: NURSE PRACTITIONER

## 2019-06-27 PROCEDURE — 25000125 ZZHC RX 250: Performed by: NURSE PRACTITIONER

## 2019-06-27 PROCEDURE — 85027 COMPLETE CBC AUTOMATED: CPT | Performed by: NURSE PRACTITIONER

## 2019-06-27 PROCEDURE — 40000893 ZZH STATISTIC PT IP EVAL DEFER

## 2019-06-27 PROCEDURE — 25000131 ZZH RX MED GY IP 250 OP 636 PS 637: Performed by: PHYSICIAN ASSISTANT

## 2019-06-27 PROCEDURE — 94640 AIRWAY INHALATION TREATMENT: CPT

## 2019-06-27 PROCEDURE — 99207 ZZC APP CREDIT; MD BILLING SHARED VISIT: CPT | Performed by: NURSE PRACTITIONER

## 2019-06-27 PROCEDURE — 94640 AIRWAY INHALATION TREATMENT: CPT | Mod: 76

## 2019-06-27 PROCEDURE — 87633 RESP VIRUS 12-25 TARGETS: CPT | Performed by: NURSE PRACTITIONER

## 2019-06-27 PROCEDURE — 36415 COLL VENOUS BLD VENIPUNCTURE: CPT | Performed by: NURSE PRACTITIONER

## 2019-06-27 PROCEDURE — 80048 BASIC METABOLIC PNL TOTAL CA: CPT | Performed by: NURSE PRACTITIONER

## 2019-06-27 PROCEDURE — 94799 UNLISTED PULMONARY SVC/PX: CPT

## 2019-06-27 PROCEDURE — G0463 HOSPITAL OUTPT CLINIC VISIT: HCPCS

## 2019-06-27 PROCEDURE — 12000001 ZZH R&B MED SURG/OB UMMC

## 2019-06-27 PROCEDURE — 40000275 ZZH STATISTIC RCP TIME EA 10 MIN

## 2019-06-27 PROCEDURE — 36415 COLL VENOUS BLD VENIPUNCTURE: CPT | Performed by: INTERNAL MEDICINE

## 2019-06-27 PROCEDURE — 40000962 ZZH STATISTIC CHRONIC DISEASE SPECIALIST RT CONSULT

## 2019-06-27 RX ORDER — CEFDINIR 300 MG/1
300 CAPSULE ORAL EVERY 12 HOURS SCHEDULED
Status: DISCONTINUED | OUTPATIENT
Start: 2019-06-27 | End: 2019-06-29 | Stop reason: HOSPADM

## 2019-06-27 RX ORDER — FLUTICASONE PROPIONATE 50 MCG
1 SPRAY, SUSPENSION (ML) NASAL DAILY
Status: DISCONTINUED | OUTPATIENT
Start: 2019-06-27 | End: 2019-06-29 | Stop reason: HOSPADM

## 2019-06-27 RX ADMIN — FLUTICASONE PROPIONATE 1 SPRAY: 50 SPRAY, METERED NASAL at 12:16

## 2019-06-27 RX ADMIN — NICOTINE POLACRILEX 4 MG: 4 GUM, CHEWING ORAL at 14:29

## 2019-06-27 RX ADMIN — CEFDINIR 300 MG: 300 CAPSULE ORAL at 21:10

## 2019-06-27 RX ADMIN — NICOTINE POLACRILEX 4 MG: 4 GUM, CHEWING ORAL at 23:04

## 2019-06-27 RX ADMIN — OXYMETAZOLINE HYDROCHLORIDE 2 SPRAY: 0.05 SPRAY NASAL at 21:10

## 2019-06-27 RX ADMIN — IPRATROPIUM BROMIDE AND ALBUTEROL SULFATE 3 ML: .5; 3 SOLUTION RESPIRATORY (INHALATION) at 15:39

## 2019-06-27 RX ADMIN — NICOTINE 1 PATCH: 21 PATCH TRANSDERMAL at 03:59

## 2019-06-27 RX ADMIN — IPRATROPIUM BROMIDE AND ALBUTEROL SULFATE 3 ML: .5; 3 SOLUTION RESPIRATORY (INHALATION) at 20:18

## 2019-06-27 RX ADMIN — CITALOPRAM HYDROBROMIDE 20 MG: 20 TABLET ORAL at 21:10

## 2019-06-27 RX ADMIN — IPRATROPIUM BROMIDE AND ALBUTEROL SULFATE 3 ML: .5; 3 SOLUTION RESPIRATORY (INHALATION) at 10:15

## 2019-06-27 RX ADMIN — GUAIFENESIN 10 ML: 100 SOLUTION ORAL at 03:58

## 2019-06-27 RX ADMIN — LOSARTAN POTASSIUM 50 MG: 50 TABLET, FILM COATED ORAL at 21:10

## 2019-06-27 RX ADMIN — OXYMETAZOLINE HYDROCHLORIDE 2 SPRAY: 0.05 SPRAY NASAL at 10:01

## 2019-06-27 RX ADMIN — AZITHROMYCIN 250 MG: 250 TABLET, FILM COATED ORAL at 10:36

## 2019-06-27 RX ADMIN — Medication 1 MG: at 00:31

## 2019-06-27 RX ADMIN — PREDNISONE 40 MG: 20 TABLET ORAL at 10:37

## 2019-06-27 ASSESSMENT — ACTIVITIES OF DAILY LIVING (ADL)
ADLS_ACUITY_SCORE: 11

## 2019-06-27 NOTE — PROVIDER NOTIFICATION
Paged Radha NP on G5 team LOW [phone] MEGAN Soriano RN [phone] FYI respiratory on the COPD team is here and recommends pt receive pneumonia vaccine

## 2019-06-27 NOTE — PLAN OF CARE
Denies pain, course lungs. Nicotine patch on L shoulder and nicotine gum X1. Need to obtain respiratory virus panel swab-oncoming nurse aware. Walk test completed.  Cont to encourage ambulation and wean O2. Will discharge home when O2 needs determined.

## 2019-06-27 NOTE — PLAN OF CARE
"PT 5A:  6 Minute Walk Orders received with reason for test \"Home Oxygen Qualifier.\"  Formal 6 Minute Walk test is not required to assess home O2 needs.  Spoke with RN who reports pt is independent with mobility.  Because no other PT needs are identified, PT will not be providing services and RN to assess O2 during gait and report data.  "

## 2019-06-27 NOTE — PROVIDER NOTIFICATION
Paged RT at [phone] MEGAN Soriano RN [phone] pt was asleep for her scheduled duo neb but is awake, would like it now. thanks.     RT gave pt neb

## 2019-06-27 NOTE — PROVIDER NOTIFICATION
Text paged Jordy CROUCH Crosscover regarding pt triggered for sepsis before transfer to unit. Lactic acid is 0.8

## 2019-06-27 NOTE — PLAN OF CARE
Pt transferred from  around 2300. Went to the ED on 6/24 because of shortness of breath and fever. Admitted as in patient last night for COPD exacerbation and  Pneumonia. Has history of COPD, Hypertension, anxiety depressive disorder. Alert and oriented x 4. Up independently in the room. Denies pain. Verbalized discomfort from frequent coughing. Triggered sepsis, Lactic acid is 0.8, Text paged Jordy Platt. On continuous puse oximeter monitoring. On room air until 4 am when noted 02 sats at 87% . Started on 02 inhalation at 2 lpm via nasal cannula. Seen by RT for neb treatment overnight. Will continue to monitor and follow plan of care

## 2019-06-27 NOTE — PROGRESS NOTES
[facility], [facility] Progress Note - Hospitalist Service, Gold 5       Date of Admission:  6/24/2019  Assessment & Plan    Teetee Reynolds is a 63 year old female admitted on 6/24/2019.  She has a past medical history of COPD and hypertension who presented to the ED with 5 days of upper URI symptoms and hypoxia and currently being treated for CAP and COPD exacerbation.  Transferred from ED observation to internal medicine on 6/26/3019 fur further care.     #Acute hypoxic respiratory failure 2/2 COPD exacerbation and CAP  Diagnosed Presented w/ URI symptoms followed by productive cough, hypoxia and chest tightness.  Afebrile, WBC 11.7 on admission.  No home 02 use.  CXR w/ bibasilar opacities, procal 0.32 on admission.  Started on steroid burst (6/25) and Azithromycin/ceftriazone with clinical improvement.  Fluctuating from RA to 2LNC.   - 6 min walk test today   - Check RVP  - Wean supplemental 02 as able to maintain sp02 >90%  - Duonebs q 4, PRN albuterol nebs   - Continue Prednisone 40 mg daily x 5 days (through  6/29)  - Contiue PO azithromycin x 5 days (through 6/29) and de-escalate IV Ceftriaxone to PO Cefdinir to complete 10 days (through 7/4)  - IS and Aerobika   - COPD team following   - PNA vaccine prior to discharge  - Needs to establish pulmonology follow up on discharge for formal PFTs (referral placed)    #Hypertension   Managed on PTA Losartan 50 mg daily.   - Continue PTA Losartan   - Follow up with PCP     #Anxiety  #Depression   Mood appears stable.  Continue PTA Gabapentin, Citalopram and Trazodone.     #Tobacco abuse  Currently smoking 1PPD x 28 years.  Evaluated by smoking cessation consult on 6/26 and would like to quit.   - Nicotine patch 21 mg   - Nicotine gum   - Continue to encourage cessation       Diet: Regular Diet Adult    DVT Prophylaxis: Low Risk/Ambulatory with no VTE prophylaxis indicated  Dave Catheter: not present  Code Status: Full Code       Disposition Plan   Expected discharge: Tomorrow, recommended to prior living arrangement once need for home 02 established and medically improved. .  Entered: Radha Richter NP 06/27/2019, 8:08 AM       The patient's care was discussed with the Attending Physician, Dr. Roy, Bedside Nurse, Care Coordinator/ and Patient.    Radha Richter NP  Hospitalist Service, [address], [place]  Pager: [phone]  Please see sticky note for cross cover information  ______________________________________________________________________    Interval History     Transferred from ED observation to internal medicine last night.  No overnight events, slept well with melatonin.  Feels her breathing is somewhat better but she sotinues to be short of breath at times and needs the oxygen.  Felt feverish when she woke up but was aferbile at the time.  No chest pain, abd pain, nausea, vomiting, dysuria, blood in urine or stools.     Data reviewed today: I reviewed all medications, new labs and imaging results over the last 24 hours.      Physical Exam   Vital Signs: Temp: 97.1  F (36.2  C) Temp src: Oral BP: (!) 149/91 Pulse: 81 Heart Rate: 81 Resp: 16 SpO2: 97 % O2 Device: Nasal cannula Oxygen Delivery: 2 LPM  Weight: 139 lbs 3.2 oz  General Appearance: NAD, sitting up in bed.  Pleasant and interactive.   Respiratory: Normal effort on 2L NC.  Lungs CTAB without wheezing, rales or rhonchi.  Diminished BS at bases bilaterally.   Cardiovascular: RRR, S1S2.  No murmurs appreciated.    GI: Abdomen soft, NTND.  Normoactive bowel sounds x 4 quadrants.   Skin: No jaundice, no rashes or open wounds on visualized skin.   Other: Neuro exam intact, no LE edema.     Data   Recent Labs   Lab 06/27/19  0917 06/26/19  1054 06/25/19  0516 06/24/19  1656   WBC 11.6* 14.4* 8.9 11.7*   HGB 13.2 13.1 12.1 13.2   * 101* 104* 101*    394 295 297     --  136 136   POTASSIUM  3.6  --  3.9 3.9   CHLORIDE 103  --  102 102   CO2 29  --  29 26   BUN 14  --  11 8   CR 0.51*  --  0.43* 0.44*   ANIONGAP 5  --  4 8   SAL 9.0  --  8.9 9.0   GLC 98  --  232* 115*   ALBUMIN  --   --   --  3.2*   PROTTOTAL  --   --   --  7.1   BILITOTAL  --   --   --  0.5   ALKPHOS  --   --   --  87   ALT  --   --   --  28   AST  --   --   --  18   TROPI  --   --   --  <0.015     No results found for this or any previous visit (from the past 24 hour(s)).  Medications       azithromycin  250 mg Oral Daily     cefdinir  300 mg Oral Q12H ALICIA     citalopram  20 mg Oral Daily     fluticasone  1 spray Both Nostrils Daily     ipratropium - albuterol 0.5 mg/2.5 mg/3 mL  3 mL Nebulization Q4H     losartan  50 mg Oral Daily     nicotine  1 patch Transdermal Daily     nicotine   Transdermal Q8H     nicotine   Transdermal Daily     oxymetazoline  2 spray Both Nostrils BID     [START ON 6/28/2019] pneumococcal  0.5 mL Intramuscular Prior to discharge     predniSONE  40 mg Oral Daily

## 2019-06-27 NOTE — PLAN OF CARE
Patient has been assessed for Home Oxygen needs. Oxygen readings:    *Pulse oximetry (SpO2) = 92% on room air at rest while awake.    *SpO2 improved to 95% on 1 liters/minute at rest.    *SpO2 = 87% on room air during activity/with exercise.    *SpO2 improved to 92% on 0.5 liters/minute during activity/with exercise.

## 2019-06-27 NOTE — PROGRESS NOTES
The Pt is alert and oriented x 4. Independent with ambulation to bathroom. Showered independently. Motrin administered for headache. Pt was on 2 Liter of O2 and she showered and O2 now 90-91% on RA. Pt continues to cough, benzocaine and Robitussin administered. Report called in to 5 A RN, Olesya.Pt informed about transfer and she verbalized understanding. Pt taken in wheel chair to 5-204 with all medications and belonging. /87 (BP Location: Right arm)   Pulse 99   Temp 98.2  F (36.8  C) (Oral)   Resp 24   Wt 63.1 kg (139 lb 3.2 oz)   SpO2 90%

## 2019-06-28 LAB
ERYTHROCYTE [DISTWIDTH] IN BLOOD BY AUTOMATED COUNT: 12.5 % (ref 10–15)
FLUAV H1 2009 PAND RNA SPEC QL NAA+PROBE: NEGATIVE
FLUAV H1 RNA SPEC QL NAA+PROBE: NEGATIVE
FLUAV H3 RNA SPEC QL NAA+PROBE: NEGATIVE
FLUAV RNA SPEC QL NAA+PROBE: NEGATIVE
FLUBV RNA SPEC QL NAA+PROBE: NEGATIVE
HADV DNA SPEC QL NAA+PROBE: NEGATIVE
HADV DNA SPEC QL NAA+PROBE: NEGATIVE
HCT VFR BLD AUTO: 40.3 % (ref 35–47)
HGB BLD-MCNC: 13 G/DL (ref 11.7–15.7)
HMPV RNA SPEC QL NAA+PROBE: NEGATIVE
HPIV1 RNA SPEC QL NAA+PROBE: NEGATIVE
HPIV2 RNA SPEC QL NAA+PROBE: NEGATIVE
HPIV3 RNA SPEC QL NAA+PROBE: NEGATIVE
MCH RBC QN AUTO: 32 PG (ref 26.5–33)
MCHC RBC AUTO-ENTMCNC: 32.3 G/DL (ref 31.5–36.5)
MCV RBC AUTO: 99 FL (ref 78–100)
MICROBIOLOGIST REVIEW: NORMAL
PLATELET # BLD AUTO: 441 10E9/L (ref 150–450)
RBC # BLD AUTO: 4.06 10E12/L (ref 3.8–5.2)
RHINOVIRUS RNA SPEC QL NAA+PROBE: NEGATIVE
RSV RNA SPEC QL NAA+PROBE: NEGATIVE
RSV RNA SPEC QL NAA+PROBE: NEGATIVE
SPECIMEN SOURCE: NORMAL
WBC # BLD AUTO: 14 10E9/L (ref 4–11)

## 2019-06-28 PROCEDURE — 99232 SBSQ HOSP IP/OBS MODERATE 35: CPT | Performed by: INTERNAL MEDICINE

## 2019-06-28 PROCEDURE — 94640 AIRWAY INHALATION TREATMENT: CPT | Mod: 76

## 2019-06-28 PROCEDURE — 40000275 ZZH STATISTIC RCP TIME EA 10 MIN

## 2019-06-28 PROCEDURE — 94640 AIRWAY INHALATION TREATMENT: CPT

## 2019-06-28 PROCEDURE — 85027 COMPLETE CBC AUTOMATED: CPT | Performed by: NURSE PRACTITIONER

## 2019-06-28 PROCEDURE — 25000132 ZZH RX MED GY IP 250 OP 250 PS 637: Performed by: NURSE PRACTITIONER

## 2019-06-28 PROCEDURE — 12000001 ZZH R&B MED SURG/OB UMMC

## 2019-06-28 PROCEDURE — 25000131 ZZH RX MED GY IP 250 OP 636 PS 637: Performed by: PHYSICIAN ASSISTANT

## 2019-06-28 PROCEDURE — 25000132 ZZH RX MED GY IP 250 OP 250 PS 637: Performed by: PHYSICIAN ASSISTANT

## 2019-06-28 PROCEDURE — 25000125 ZZHC RX 250: Performed by: NURSE PRACTITIONER

## 2019-06-28 PROCEDURE — 94799 UNLISTED PULMONARY SVC/PX: CPT

## 2019-06-28 PROCEDURE — 99207 ZZC APP CREDIT; MD BILLING SHARED VISIT: CPT | Performed by: NURSE PRACTITIONER

## 2019-06-28 PROCEDURE — 36415 COLL VENOUS BLD VENIPUNCTURE: CPT | Performed by: NURSE PRACTITIONER

## 2019-06-28 RX ORDER — FLUTICASONE PROPIONATE 50 MCG
1 SPRAY, SUSPENSION (ML) NASAL DAILY
Qty: 9.9 ML | Refills: 0 | Status: SHIPPED | OUTPATIENT
Start: 2019-06-29

## 2019-06-28 RX ORDER — ALBUTEROL SULFATE 90 UG/1
1-2 AEROSOL, METERED RESPIRATORY (INHALATION) EVERY 6 HOURS PRN
Qty: 1 INHALER | Refills: 3 | Status: SHIPPED | OUTPATIENT
Start: 2019-06-28

## 2019-06-28 RX ORDER — BENZONATATE 100 MG/1
100 CAPSULE ORAL 3 TIMES DAILY PRN
Qty: 90 CAPSULE | Refills: 0 | Status: SHIPPED | OUTPATIENT
Start: 2019-06-28

## 2019-06-28 RX ORDER — ALBUTEROL SULFATE 90 UG/1
1-2 AEROSOL, METERED RESPIRATORY (INHALATION) EVERY 6 HOURS PRN
Qty: 1 INHALER | Refills: 3 | Status: SHIPPED | OUTPATIENT
Start: 2019-06-28 | End: 2019-06-28

## 2019-06-28 RX ORDER — ALBUTEROL SULFATE 0.83 MG/ML
2.5 SOLUTION RESPIRATORY (INHALATION) 3 TIMES DAILY PRN
Qty: 9 ML | Refills: 0 | Status: SHIPPED | OUTPATIENT
Start: 2019-06-28

## 2019-06-28 RX ORDER — ALBUTEROL SULFATE 0.83 MG/ML
2.5 SOLUTION RESPIRATORY (INHALATION) 3 TIMES DAILY PRN
Qty: 9 ML | Refills: 0 | Status: SHIPPED | OUTPATIENT
Start: 2019-06-28 | End: 2019-06-28

## 2019-06-28 RX ORDER — NICOTINE 21 MG/24HR
1 PATCH, TRANSDERMAL 24 HOURS TRANSDERMAL DAILY
Qty: 30 PATCH | Refills: 0 | Status: SHIPPED | OUTPATIENT
Start: 2019-06-29 | End: 2019-12-13

## 2019-06-28 RX ORDER — CEFDINIR 300 MG/1
300 CAPSULE ORAL EVERY 12 HOURS
Qty: 8 CAPSULE | Refills: 0 | Status: SHIPPED | OUTPATIENT
Start: 2019-06-30 | End: 2019-12-13

## 2019-06-28 RX ORDER — NICOTINE 21 MG/24HR
1 PATCH, TRANSDERMAL 24 HOURS TRANSDERMAL DAILY
Qty: 30 PATCH | Refills: 0 | Status: SHIPPED | OUTPATIENT
Start: 2019-06-29 | End: 2019-06-28

## 2019-06-28 RX ORDER — FLUTICASONE PROPIONATE 50 MCG
1 SPRAY, SUSPENSION (ML) NASAL DAILY
Qty: 9.9 ML | Refills: 0 | Status: SHIPPED | OUTPATIENT
Start: 2019-06-29 | End: 2019-06-28

## 2019-06-28 RX ORDER — PREDNISONE 20 MG/1
40 TABLET ORAL DAILY
Qty: 1 TABLET | Refills: 0 | Status: SHIPPED | OUTPATIENT
Start: 2019-06-29 | End: 2019-06-28

## 2019-06-28 RX ORDER — CEFDINIR 300 MG/1
300 CAPSULE ORAL EVERY 12 HOURS
Qty: 12 CAPSULE | Refills: 0 | Status: SHIPPED | OUTPATIENT
Start: 2019-06-28 | End: 2019-06-28

## 2019-06-28 RX ORDER — BENZONATATE 100 MG/1
100 CAPSULE ORAL 3 TIMES DAILY PRN
Qty: 90 CAPSULE | Refills: 0 | Status: SHIPPED | OUTPATIENT
Start: 2019-06-28 | End: 2019-06-28

## 2019-06-28 RX ADMIN — NICOTINE POLACRILEX 4 MG: 4 GUM, CHEWING ORAL at 09:23

## 2019-06-28 RX ADMIN — LOSARTAN POTASSIUM 50 MG: 50 TABLET, FILM COATED ORAL at 20:50

## 2019-06-28 RX ADMIN — NICOTINE 1 PATCH: 21 PATCH TRANSDERMAL at 09:20

## 2019-06-28 RX ADMIN — CEFDINIR 300 MG: 300 CAPSULE ORAL at 20:50

## 2019-06-28 RX ADMIN — CITALOPRAM HYDROBROMIDE 20 MG: 20 TABLET ORAL at 20:50

## 2019-06-28 RX ADMIN — IPRATROPIUM BROMIDE AND ALBUTEROL SULFATE 3 ML: .5; 3 SOLUTION RESPIRATORY (INHALATION) at 19:37

## 2019-06-28 RX ADMIN — IPRATROPIUM BROMIDE AND ALBUTEROL SULFATE 3 ML: .5; 3 SOLUTION RESPIRATORY (INHALATION) at 15:38

## 2019-06-28 RX ADMIN — NICOTINE POLACRILEX 4 MG: 4 GUM, CHEWING ORAL at 01:28

## 2019-06-28 RX ADMIN — GUAIFENESIN 10 ML: 100 SOLUTION ORAL at 01:28

## 2019-06-28 RX ADMIN — GUAIFENESIN 10 ML: 100 SOLUTION ORAL at 09:28

## 2019-06-28 RX ADMIN — PREDNISONE 40 MG: 20 TABLET ORAL at 09:15

## 2019-06-28 RX ADMIN — Medication 1 MG: at 01:28

## 2019-06-28 RX ADMIN — FLUTICASONE PROPIONATE 1 SPRAY: 50 SPRAY, METERED NASAL at 09:16

## 2019-06-28 RX ADMIN — CEFDINIR 300 MG: 300 CAPSULE ORAL at 09:16

## 2019-06-28 RX ADMIN — IPRATROPIUM BROMIDE AND ALBUTEROL SULFATE 3 ML: .5; 3 SOLUTION RESPIRATORY (INHALATION) at 07:57

## 2019-06-28 RX ADMIN — OXYMETAZOLINE HYDROCHLORIDE 2 SPRAY: 0.05 SPRAY NASAL at 09:16

## 2019-06-28 RX ADMIN — AZITHROMYCIN 250 MG: 250 TABLET, FILM COATED ORAL at 09:16

## 2019-06-28 RX ADMIN — NICOTINE POLACRILEX 4 MG: 4 GUM, CHEWING ORAL at 16:09

## 2019-06-28 RX ADMIN — IPRATROPIUM BROMIDE AND ALBUTEROL SULFATE 3 ML: .5; 3 SOLUTION RESPIRATORY (INHALATION) at 13:12

## 2019-06-28 RX ADMIN — IPRATROPIUM BROMIDE AND ALBUTEROL SULFATE 3 ML: .5; 3 SOLUTION RESPIRATORY (INHALATION) at 00:52

## 2019-06-28 RX ADMIN — NICOTINE POLACRILEX 4 MG: 4 GUM, CHEWING ORAL at 22:40

## 2019-06-28 ASSESSMENT — ACTIVITIES OF DAILY LIVING (ADL)
ADLS_ACUITY_SCORE: 11

## 2019-06-28 NOTE — DISCHARGE SUMMARY
[facility], [place]  Hospitalist Discharge Summary       Date of Admission:  6/24/2019  Date of Discharge:  6/28/2019  Discharging Provider: Radha Richter NP  Discharge Team: Hospitalist Service, Gold 5    Discharge Diagnoses   1.  Acute hypoxic respiratory failure 2/2 COPD exacerbation and CAP  2.  Hypertension  3.  Depression  4.  Anxiety  5.  Tobacco Abuse     Follow-ups Needed After Discharge   Follow-up Appointments     Adult [facility] Follow-up and recommended labs and tests      Follow up with primary care provider, José Miguel Milan, within 7 days for   hospital follow- up.  No follow up labs or test are needed.    Follow up with pulmonology for full consultation and PFTs on discharge.          Appointments on [place] and/or [facility] (with [facility] or [facility]   provider or service). Call [phone] if you haven't heard regarding   these appointments within 7 days of discharge.           Unresulted Labs Ordered in the Past 30 Days of this Admission     Date and Time Order Name Status Description    6/27/2019 1020 Respiratory Virus Panel by PCR In process     6/24/2019 1713 Blood Culture ONE site Preliminary       These results will be followed up by PCP    Discharge Disposition   Discharged to home  Condition at discharge: Stable    Hospital Course   Teetee Reynolds is a 63 year old female admitted on 6/24/2019.  She has a past medical history of COPD and hypertension who presented to the ED with 5 days of upper URI symptoms and hypoxia and currently being treated for CAP and COPD exacerbation.  Transferred from ED observation to internal medicine on 6/26/3019 fur further care.  The following problems were addressed during this hospitalization:    #Acute hypoxic respiratory failure 2/2 COPD exacerbation and CAP.  Presented with URI symptoms followed by productive cough, hypoxia and chest tightness.  Afebrile, WBC 11.7 on admission, elevated to 14 on 6/28 but this is felt to be 2/2  steroid burst given her clinical improvement.  No home 02 use.  CXR showed bibasilar opacities, procal 0.32 on admission.  Started on Prednisone 40 mg daily and antibiotics.  Completed Azithromycin on 6/28.  She was intermittently requiring supplemental oxygenation this hospitalization but no 02 use in 24 hours prior to discharge.  COPD team was consulted as was smoking cessation team.  She completed a walk test on 6/28 and showed that she did not drop below 91% with ambulation/exercise.    On discharge:  - Continue Albuterol nebs and inhaler as needed (new prescription sent with her)  - Establish care with a pulmonologist for consultation and formal PFTs, referral to be place by PCP as her care team is through [facility]   - Complete 5 day prednisone course on 6/29.   - Continue oral Cefdinir course through 7/4/2019  - Continue Aerobika at home   - PRN Tessalon pearls and Robitussin  - Tylenol/Ibuporofen PRN pain   - Follow up with PCP within one week   - PNA vaccine prior to discharge    #Hypertension.  Blood pressures slightly high this admission but no changes were made as this was likely 2/2 acute illness.  Continue  PTA Losartan 50 mg daily.  Follow up with PCP for ongoing management.     #Anxiety; Depression.  Mood remained stable this admission, continue PTA Gabapentin, Citalopram and Trazodone on discharge.     #Tobacco abuse.  Currently smoking 1PPD x 28 years.  Evaluated by smoking cessation consult on 6/26 and would like to quit.   On discharge:  - Continue Nicotine patch 21 mg and nicotine gum PRN   - Follow up with PCP     Consultations This Hospital Stay   MEDICATION HISTORY IP PHARMACY CONSULT  CARE COORDINATOR IP CONSULT  VASCULAR ACCESS CARE ADULT IP CONSULT  IP RESPIRATORY CARE CHRONIC PULMONARY DISEASE SPECIALIST  IP RESPIRATORY CARE CHRONIC PULMONARY DISEASE SPECIALIST  VASCULAR ACCESS CARE ADULT IP CONSULT  SMOKING CESSATION PROGRAM IP CONSULT    Code Status   Full Code    Time Spent on  this Encounter   I, Radha Richter, personally saw the patient today and spent greater than 30 minutes discharging this patient.     Radha Richter, NP  [facility], [place]  ______________________________________________________________________    Physical Exam   Vital Signs: Temp: 98.6  F (37  C) Temp src: Oral BP: 159/86 Pulse: 73 Heart Rate: 76 Resp: 16 SpO2: 93 % O2 Device: None (Room air)    Weight: 139 lbs 3.2 oz  General Appearance: NAD, sitting up in be comfortable on RA.    Respiratory: Normal effort on RA.  No wheezes, rales or rhonchi.  Good air flow bilaterally.   Cardiovascular: RRR, S1S2.  No murmurs appreciated.   GI: Abdomen soft, non-tender, non-distended.   Skin: No jaundice, no rashes or open wounds on visualized skin.   Other: No lower leg edema, A+O x 3.        Primary Care Physician   José Miguel Milan    Discharge Orders      Reason for your hospital stay    Dear Teetee Reynolds    Your were hospitalized at [facility] with pneumonia and a COPD excerbation and treated with antibiotics and a steroid burst.  Over your hospitalization your breathingh improved and today you are ready to be discharged home.  If you continue antibiotic, steroid and nebulizer therapy you should continue to improve but if you develop fever, shortness of breath, light headedness, chest pain or severe abdominal pain please seek medical attention.    We are suggesting the following medication changes:  1.  Take prednisone 40 mg x 1 more day (on 6/29) then discontinue  2.  Continue oral Cefdinir through 7/4/2019 to complete a 10 day course.    3.  Continue Albuterol nebs and Albuterol inhaler  4.  Start Robitussin and Tessalon Pearls as needed for cough     Please get the following tests done:  1.  Pulmonary function tests.     Please set up an appointment with:  1.  Your primary care physician within one week.  No labs recommended.  Referral to pulmonologist at [facility] recommended.   2.  Follow up with pulmonology for full consultation and PFTs on discharge.      It was a pleasure meeting with you today. Thank you for allowing me and my team the privilege of caring for you today. You are the reason we are here, and I truly hope we provided you with the excellent service you deserve. Please let us know if there is anything else we can do for you so that we can be sure you are leaving completely satisfied with your care experience.    Your hospital unit at the time of discharge is 5A so if you have any questions please call the hospital at [phone] and ask to talk to a nurse on 5A.    Take care!  Radha Richter, MPH, [facility]  Hospitalist Service  Pager [phone]     Adult [facility] Follow-up and recommended labs and tests    Follow up with primary care provider, José Miguel Milan, within 7 days for hospital follow- up.  No follow up labs or test are needed.    Follow up with pulmonology for full consultation and PFTs on discharge.        Appointments on [place] and/or [facility] (with [facility] or [facility] provider or service). Call [phone] if you haven't heard regarding these appointments within 7 days of discharge.     Activity    Your activity upon discharge: activity as tolerated     When to contact your care team    Call your PCP or return to ED for temperatures > 100.7 degrees, worsening or changing pain, uncontrolled vomiting or inability to tolerate oral intake, new or worsening diarrhea, new or worsening shortness of breath, decreased urine output, yellowing of the eyes or skin, confusion, weakness, blood in urine or stools.     Full Code     Diet    Follow this diet upon discharge: Orders Placed This Encounter      Regular Diet Adult       Significant Results and Procedures   Most Recent 3 CBC's:  Recent Labs   Lab Test 06/28/19  0641 06/27/19  0917 06/26/19  1054   WBC 14.0* 11.6* 14.4*   HGB 13.0 13.2 13.1   MCV 99 101* 101*    411 394     Most  Recent 3 BMP's:  Recent Labs   Lab Test 06/27/19  0917 06/25/19  0516 06/24/19  1656    136 136   POTASSIUM 3.6 3.9 3.9   CHLORIDE 103 102 102   CO2 29 29 26   BUN 14 11 8   CR 0.51* 0.43* 0.44*   ANIONGAP 5 4 8   SAL 9.0 8.9 9.0   GLC 98 232* 115*     Most Recent 2 LFT's:  Recent Labs   Lab Test 06/24/19  1656   AST 18   ALT 28   ALKPHOS 87   BILITOTAL 0.5     Most Recent 3 INR's:No lab results found.,   Results for orders placed or performed during the hospital encounter of 06/24/19   XR Chest Port 1 View    Narrative    XR CHEST PORT 1 VW  6/24/2019 5:27 PM      HISTORY: fever cough hypoxia    COMPARISON: None    FINDINGS:   Single] AP view of the chest. Partial visualization of cervical fusion  hardware.    Trachea is midline. Cardiomediastinal silhouette within normal limits.  Pulmonary vasculature is distinct. Bibasilar and upper left lower lobe  reticular opacities. Retrocardiac opacities. Trace left pleural  effusion. No pneumothorax.    Visualized portions of the abdomen, soft tissues and osseous thorax is  unremarkable.      Impression    IMPRESSION:   1. Retrocardiac opacities which could represent infection or  atelectasis.  2. Bibasilar and superior left lower lobe reticular opacities which  may represent atypical infection versus pulmonary edema.  3. Trace left pleural effusion.    I have personally reviewed the examination and initial interpretation  and I agree with the findings.    AUGUSTA MALDONADO MD   XR Chest 2 Views    Narrative    Exam: XR CHEST 2 VW, 6/25/2019 8:12 AM    Indication: Possible LLL pneumonia    Comparison: 6/24/2019    Findings:   PA and lateral views of the chest. Partially visualized cervical  fusion hardware. Presumed nasal cannula oxygen tube overlies the film.  Cardiomediastinal silhouette is not enlarged. Improved retrocardiac  and basilar opacities. Possibly some mild venous congestion. No  pneumothorax. No large pleural effusion. Visualized upper abdomen  is  unremarkable. Mild convex right curvature of the midthoracic spine. No  acute osseous abnormalities.      Impression    Impression: Improved retrocardiac and left basilar opacities. Possibly  some mild venous congestion.    I have personally reviewed the examination and initial interpretation  and I agree with the findings.    FINA RIVERA MD       Discharge Medications   Current Discharge Medication List      START taking these medications    Details   benzonatate (TESSALON) 100 MG capsule Take 1 capsule (100 mg) by mouth 3 times daily as needed for cough  Qty: 90 capsule, Refills: 0    Associated Diagnoses: COPD exacerbation (H)      cefdinir (OMNICEF) 300 MG capsule Take 1 capsule (300 mg) by mouth every 12 hours for 6 days  Qty: 12 capsule, Refills: 0    Associated Diagnoses: Pneumonia of both lower lobes due to infectious organism (H)      fluticasone (FLONASE) 50 MCG/ACT nasal spray Spray 1 spray into both nostrils daily  Qty: 9.9 mL, Refills: 0    Associated Diagnoses: Pneumonia of both lower lobes due to infectious organism (H)      guaiFENesin (ROBITUSSIN) 20 mg/mL SOLN solution Take 10 mLs by mouth every 4 hours as needed for cough  Qty: 90 mL, Refills: 0    Associated Diagnoses: Pneumonia of both lower lobes due to infectious organism (H)      nicotine (NICODERM CQ) 21 MG/24HR 24 hr patch Place 1 patch onto the skin daily  Qty: 30 patch, Refills: 0    Associated Diagnoses: Tobacco abuse      nicotine polacrilex (NICORETTE) 4 MG gum Place 1 each (4 mg) inside cheek every hour as needed for smoking cessation  Qty: 100 each, Refills: 0    Associated Diagnoses: Tobacco abuse      predniSONE (DELTASONE) 20 MG tablet Take 2 tablets (40 mg) by mouth daily  Qty: 1 tablet, Refills: 0    Associated Diagnoses: COPD exacerbation (H)         CONTINUE these medications which have CHANGED    Details   albuterol (PROAIR HFA/PROVENTIL HFA/VENTOLIN HFA) 108 (90 Base) MCG/ACT inhaler Inhale 1-2 puffs into the lungs  every 6 hours as needed for shortness of breath / dyspnea or wheezing  Qty: 1 Inhaler, Refills: 3    Associated Diagnoses: COPD exacerbation (H)      albuterol (PROVENTIL) (2.5 MG/3ML) 0.083% neb solution Take 1 vial (2.5 mg) by nebulization 3 times daily as needed for shortness of breath / dyspnea or wheezing (Typically takes 1-2x daily)  Qty: 9 mL, Refills: 0    Associated Diagnoses: COPD exacerbation (H)         CONTINUE these medications which have NOT CHANGED    Details   aspirin (ASA) 325 MG EC tablet Take 325 mg by mouth every 6 hours as needed for moderate pain      citalopram (CELEXA) 20 MG tablet Take 20 mg by mouth daily      gabapentin (NEURONTIN) 100 MG capsule Take 100 mg by mouth 3 times daily as needed Take 1-3 capsules by mouth daily as needed.      ibuprofen (ADVIL/MOTRIN) 200 MG tablet Take 200 mg by mouth every 4 hours as needed for mild pain      losartan (COZAAR) 50 MG tablet Take 50 mg by mouth daily      traZODone (DESYREL) 150 MG tablet Take 150 mg by mouth nightly as needed for sleep           Allergies   Allergies   Allergen Reactions     Lisinopril Cough

## 2019-06-28 NOTE — PROGRESS NOTES
[facility], [facility] Progress Note - Hospitalist Service, Gold 5       Date of Admission:  6/24/2019  Assessment & Plan    Teetee Reynolds is a 63 year old female admitted on 6/24/2019.  She has a past medical history of COPD and hypertension who presented to the ED with 5 days of upper URI symptoms and hypoxia and currently being treated for CAP and COPD exacerbation.  Transferred from ED observation to internal medicine on 6/26/3019 fur further care.     #Acute hypoxic respiratory failure 2/2 COPD exacerbation and CAP  Diagnosed Presented w/ URI symptoms followed by productive cough, hypoxia and chest tightness.  Afebrile, WBC 11.7 on admission.  No home 02 use.  CXR w/ bibasilar opacities, procal 0.32 on admission.  Started on steroid burst (6/25) and Azithromycin/ceftriazone with clinical improvement.  Weaned to RA today.  Repeat 6 min walk test today without drop below 91% on RA with exercise, will not need 02 on discharge.   - Follow RVP  - Supplemental 02 as needed to maintain sp02 >90%  - Duonebs q 4, PRN albuterol nebs   - Continue Prednisone 40 mg daily x 5 days (through tomorrow)  - Contiue PO azithromycin x 5 days (through today and PO Cefdinir to complete 10 days (through 7/4)  - IS and Aerobika   - COPD team following   - PNA vaccine prior to discharge  - Needs to establish pulmonology follow up on discharge for formal PFTs  - PNA vaccine prior to discharge     #Hypertension   Managed on PTA Losartan 50 mg daily.   - Continue PTA Losartan   - Follow up with PCP     #Anxiety  #Depression   Mood appears stable.  Continue PTA Gabapentin, Citalopram and Trazodone.     #Tobacco abuse  Currently smoking 1PPD x 28 years.  Evaluated by smoking cessation consult on 6/26 and would like to quit.   - Nicotine patch 21 mg   - Nicotine gum   - Continue to encourage cessation     #Leuckocytosis  WBC 14 (11.6).  Likely elevated in setting of CAP but acute worsening most likely 2/2  steroid burst as she is clinically improved and afebrile.    - CBC in AM tomorrow     Diet: Regular Diet Adult  Diet    DVT Prophylaxis: Low Risk/Ambulatory with no VTE prophylaxis indicated  Dave Catheter: not present  Code Status: Full Code      Disposition Plan   Expected discharge: Tomorrow, recommended to with family once remains off 02 overnight. .  Entered: Radha Richter NP 06/28/2019, 1:55 PM       The patient's care was discussed with the Attending Physician, Dr. Roy, Bedside Nurse, Care Coordinator/ and Patient.    Radha Richter NP  Hospitalist Service, [address], [place]  Pager: [phone]  Please see sticky note for cross cover information  ______________________________________________________________________    Interval History     Doing better today overall.  Weaned off oxygen yesterday.  She has some mild shortness of breath and a cough.  She endorses back pain from coughing.  No fevers, chills, nausea, vomiting, chest pain, nausea, vomiting, abdominal pain, dysuria, diarrhea.      Data reviewed today: I reviewed all medications, new labs and imaging results over the last 24 hours.    Physical Exam   Vital Signs: Temp: 98.6  F (37  C) Temp src: Oral BP: 159/86 Pulse: 73 Heart Rate: 88 Resp: 16 SpO2: 92 % O2 Device: None (Room air)    Weight: 139 lbs 3.2 oz  General Appearance: NAD, sitting up in bed.  Pleasant and interactive.   Respiratory: Normal effort on 2L NC.  Lungs CTAB without wheezing, rales or rhonchi.  Diminished BS at bases bilaterally.   Cardiovascular: RRR, S1S2.  No murmurs appreciated.    GI: Abdomen soft, NTND.  Normoactive bowel sounds x 4 quadrants.   Skin: No jaundice, no rashes or open wounds on visualized skin.   Other: Neuro exam intact, no LE edema.     Data   Recent Labs   Lab 06/28/19  0641 06/27/19  0917 06/26/19  1054 06/25/19  0516 06/24/19  1656   WBC 14.0* 11.6* 14.4* 8.9 11.7*   HGB 13.0 13.2 13.1  12.1 13.2   MCV 99 101* 101* 104* 101*    411 394 295 297   NA  --  138  --  136 136   POTASSIUM  --  3.6  --  3.9 3.9   CHLORIDE  --  103  --  102 102   CO2  --  29  --  29 26   BUN  --  14  --  11 8   CR  --  0.51*  --  0.43* 0.44*   ANIONGAP  --  5  --  4 8   SAL  --  9.0  --  8.9 9.0   GLC  --  98  --  232* 115*   ALBUMIN  --   --   --   --  3.2*   PROTTOTAL  --   --   --   --  7.1   BILITOTAL  --   --   --   --  0.5   ALKPHOS  --   --   --   --  87   ALT  --   --   --   --  28   AST  --   --   --   --  18   TROPI  --   --   --   --  <0.015     No results found for this or any previous visit (from the past 24 hour(s)).  Medications       cefdinir  300 mg Oral Q12H [facility]     citalopram  20 mg Oral Daily     fluticasone  1 spray Both Nostrils Daily     ipratropium - albuterol 0.5 mg/2.5 mg/3 mL  3 mL Nebulization Q4H     losartan  50 mg Oral Daily     nicotine  1 patch Transdermal Daily     nicotine   Transdermal Q8H     nicotine   Transdermal Daily     pneumococcal  0.5 mL Intramuscular Prior to discharge     predniSONE  40 mg Oral Daily

## 2019-06-28 NOTE — PLAN OF CARE
"PT 5A:  Another 6 Minute Walk Order was entered today for test \"Home Oxygen Qualifier.\"  PT administered formal 6 Minute Walk Tests when indicated.  Formal 6 Minute Walk test is not required to assess home O2 needs.  Spoke with RN who reports pt is independent with mobility.  Because no other PT needs are identified, PT will not be providing services and RN to assess O2 during gait and report data.  "

## 2019-06-28 NOTE — PROGRESS NOTES
Patient is doing better, and has been weaned off oxygen, did 6 mins walk on room air and pulse ox has been in the 90's to 94% on RA.  Had BM, walks around in the hallway by self. Good appetite, Encouraged to do deep breathing as tolerated, continue to monitor.

## 2019-06-28 NOTE — DISCHARGE SUMMARY
[facility], [place]  Hospitalist Discharge Summary       Date of Admission:  6/24/2019  Date of Discharge:  6/29/2019  Discharging Provider: Radha Richter NP  Discharge Team: Hospitalist Service, Gold 5    Discharge Diagnoses   1.  Acute hypoxic respiratory failure 2/2 COPD exacerbation and CAP  2.  Hypertension  3.  Depression  4.  Anxiety  5.  Tobacco Abuse     Follow-ups Needed After Discharge   Follow-up Appointments     Adult [facility] Follow-up and recommended labs and tests      Follow up with primary care provider, José Miguel Milan, within 7 days for   hospital follow- up.  No follow up labs or test are needed.    Follow up with pulmonology for full consultation and PFTs on discharge.          Appointments on [place] and/or [facility] (with [facility] or [facility]   provider or service). Call [phone] if you haven't heard regarding   these appointments within 7 days of discharge.             Unresulted Labs Ordered in the Past 30 Days of this Admission     Date and Time Order Name Status Description    6/24/2019 1713 Blood Culture ONE site Preliminary       These results will be followed up by PCP.    Discharge Disposition   Discharged to home - brother's home for weekend.   Condition at discharge: Stable    Hospital Course   Teetee Reynolds is a 63 year old female admitted on 6/24/2019.  She has a past medical history of COPD and hypertension who presented to the ED with 5 days of upper URI symptoms and hypoxia and currently being treated for CAP and COPD exacerbation.  Transferred from ED observation to internal medicine on 6/26/3019 fur further care.  The following problems were addressed during this hospitalization:    #Acute hypoxic respiratory failure 2/2 COPD exacerbation and CAP.  Presented with URI symptoms followed by productive cough, hypoxia and chest tightness.  Afebrile, WBC 11.7 on admission, elevated to 14 on 6/28 but this is felt to be 2/2 steroid burst given her  clinical improvement.  No home 02 use.  CXR showed bibasilar opacities, procal 0.32 on admission.  Started on Prednisone 40 mg daily and antibiotics.  Completed Azithromycin on 6/28 and Prednisone burst on 6/29.  She was intermittently requiring supplemental oxygenation this hospitalization but no 02 use in 24 hours prior to discharge.  COPD team was consulted as was smoking cessation team.  She completed a walk test on 6/28 and showed that she did not drop below 91% with ambulation/exercise.    On discharge:  - Continue Albuterol nebs and inhaler as needed (new prescription sent with her)  - Establish care with a pulmonologist for consultation and formal PFTs, referral to be place by PCP as her care team is through "[employer]   - Continue oral Cefdinir course through 7/4/2019  - Continue Aerobika at home   - PRN Tessalon pearls and Robitussin  - Tylenol/Ibuporofen PRN pain   - Follow up with PCP within one week   - PNA vaccine given prior to discharge    #Hypertension.  Blood pressures slightly high this admission but no changes were made as this was likely 2/2 acute illness.  Continue  PTA Losartan 50 mg daily.  Follow up with PCP for ongoing management. One week supply of her BP medications was given to her on discharge.     #Anxiety; Depression.  Mood remained stable this admission, continue PTA Gabapentin, Citalopram and Trazodone on discharge. One week supply of her home medications were given to her on discharge.    #Tobacco abuse.  Currently smoking 1PPD x 28 years.  Evaluated by smoking cessation consult on 6/26 and would like to quit.   On discharge:  - Continue Nicotine patch 21 mg and nicotine gum PRN   - Follow up with PCP     Consultations This Hospital Stay   MEDICATION HISTORY IP PHARMACY CONSULT  CARE COORDINATOR IP CONSULT  VASCULAR ACCESS CARE ADULT IP CONSULT  IP RESPIRATORY CARE CHRONIC PULMONARY DISEASE SPECIALIST  IP RESPIRATORY CARE CHRONIC PULMONARY DISEASE SPECIALIST  VASCULAR ACCESS  CARE ADULT IP CONSULT  SMOKING CESSATION PROGRAM IP CONSULT    Code Status   Full Code    Time Spent on this Encounter   I, Radha Richter, personally saw the patient today and spent greater than 30 minutes discharging this patient.       Radha Richter, GALLITO  [facility] [place]  ______________________________________________________________________    Physical Exam   Vital Signs: Temp: 98.3  F (36.8  C) Temp src: Oral BP: 158/85   Heart Rate: 73 Resp: 20 SpO2: 92 % O2 Device: None (Room air)    Weight: 139 lbs 3.2 oz  General Appearance: NAD, sitting up in bed comfortable on RA.    Respiratory: Normal effort on RA.  No wheezes, rales or rhonchi.  Good air flow bilaterally.   Cardiovascular: RRR, S1S2.  No murmurs appreciated.   GI: Abdomen soft, non-tender, non-distended.   Skin: No jaundice, no rashes or open wounds on visualized skin.   Other: No lower leg edema, A+O x 3.        Primary Care Physician   José Miguel Milan    Discharge Orders      Reason for your hospital stay    Dear Teetee Reynolds    Your were hospitalized at [facility] with pneumonia and a COPD excerbation and treated with antibiotics and a steroid burst.  Over your hospitalization your breathingh improved and today you are ready to be discharged home.  If you continue antibiotic, steroid and nebulizer therapy you should continue to improve but if you develop fever, shortness of breath, light headedness, chest pain or severe abdominal pain please seek medical attention.    We are suggesting the following medication changes:  1.  Take prednisone 40 mg x 1 more day (on 6/29) then discontinue  2.  Continue oral Cefdinir through 7/4/2019 to complete a 10 day course.    3.  Continue Albuterol nebs and Albuterol inhaler  4.  Start Robitussin and Tessalon Pearls as needed for cough     Please get the following tests done:  1.  Pulmonary function tests.     Please set up an appointment with:  1.  Your  primary care physician within one week.  No labs recommended.  Referral to pulmonologist at [facility] recommended.   2.  Follow up with pulmonology for full consultation and PFTs on discharge.      It was a pleasure meeting with you today. Thank you for allowing me and my team the privilege of caring for you today. You are the reason we are here, and I truly hope we provided you with the excellent service you deserve. Please let us know if there is anything else we can do for you so that we can be sure you are leaving completely satisfied with your care experience.    Your hospital unit at the time of discharge is 5A so if you have any questions please call the hospital at [phone] and ask to talk to a nurse on 5A.    Take care!  Radha Richter, MPH, [facility]  Hospitalist Service  Pager [phone]     Adult [facility] Follow-up and recommended labs and tests    Follow up with primary care provider, José Miguel Milan, within 7 days for hospital follow- up.  No follow up labs or test are needed.    Follow up with pulmonology for full consultation and PFTs on discharge.        Appointments on [place] and/or [facility] (with [facility] or [facility] provider or service). Call [phone] if you haven't heard regarding these appointments within 7 days of discharge.     Activity    Your activity upon discharge: activity as tolerated     When to contact your care team    Call your PCP or return to ED for temperatures > 100.7 degrees, worsening or changing pain, uncontrolled vomiting or inability to tolerate oral intake, new or worsening diarrhea, new or worsening shortness of breath, decreased urine output, yellowing of the eyes or skin, confusion, weakness, blood in urine or stools.     Full Code     Diet    Follow this diet upon discharge: Orders Placed This Encounter      Regular Diet Adult       Significant Results and Procedures   Most Recent 3 CBC's:  Recent Labs   Lab Test 06/29/19  0751 06/28/19  0641  06/27/19  0917   WBC 13.8* 14.0* 11.6*   HGB 13.4 13.0 13.2   MCV 99 99 101*   * 441 411     Most Recent 3 BMP's:  Recent Labs   Lab Test 06/27/19  0917 06/25/19  0516 06/24/19  1656    136 136   POTASSIUM 3.6 3.9 3.9   CHLORIDE 103 102 102   CO2 29 29 26   BUN 14 11 8   CR 0.51* 0.43* 0.44*   ANIONGAP 5 4 8   SAL 9.0 8.9 9.0   GLC 98 232* 115*     Most Recent 2 LFT's:  Recent Labs   Lab Test 06/24/19  1656   AST 18   ALT 28   ALKPHOS 87   BILITOTAL 0.5     Most Recent 3 INR's:No lab results found.,   Results for orders placed or performed during the hospital encounter of 06/24/19   XR Chest Port 1 View    Narrative    XR CHEST PORT 1 VW  6/24/2019 5:27 PM      HISTORY: fever cough hypoxia    COMPARISON: None    FINDINGS:   Single] AP view of the chest. Partial visualization of cervical fusion  hardware.    Trachea is midline. Cardiomediastinal silhouette within normal limits.  Pulmonary vasculature is distinct. Bibasilar and upper left lower lobe  reticular opacities. Retrocardiac opacities. Trace left pleural  effusion. No pneumothorax.    Visualized portions of the abdomen, soft tissues and osseous thorax is  unremarkable.      Impression    IMPRESSION:   1. Retrocardiac opacities which could represent infection or  atelectasis.  2. Bibasilar and superior left lower lobe reticular opacities which  may represent atypical infection versus pulmonary edema.  3. Trace left pleural effusion.    I have personally reviewed the examination and initial interpretation  and I agree with the findings.    AUGUSTA MALDONADO MD   XR Chest 2 Views    Narrative    Exam: XR CHEST 2 VW, 6/25/2019 8:12 AM    Indication: Possible LLL pneumonia    Comparison: 6/24/2019    Findings:   PA and lateral views of the chest. Partially visualized cervical  fusion hardware. Presumed nasal cannula oxygen tube overlies the film.  Cardiomediastinal silhouette is not enlarged. Improved retrocardiac  and basilar opacities. Possibly  some mild venous congestion. No  pneumothorax. No large pleural effusion. Visualized upper abdomen is  unremarkable. Mild convex right curvature of the midthoracic spine. No  acute osseous abnormalities.      Impression    Impression: Improved retrocardiac and left basilar opacities. Possibly  some mild venous congestion.    I have personally reviewed the examination and initial interpretation  and I agree with the findings.    FINA RIVERA MD       Discharge Medications   Current Discharge Medication List      START taking these medications    Details   benzonatate (TESSALON) 100 MG capsule Take 1 capsule (100 mg) by mouth 3 times daily as needed for cough  Qty: 90 capsule, Refills: 0    Associated Diagnoses: COPD exacerbation (H)      cefdinir (OMNICEF) 300 MG capsule Take 1 capsule (300 mg) by mouth every 12 hours for 4 days  Qty: 8 capsule, Refills: 0    Associated Diagnoses: Pneumonia of both lower lobes due to infectious organism (H)      fluticasone (FLONASE) 50 MCG/ACT nasal spray Spray 1 spray into both nostrils daily  Qty: 9.9 mL, Refills: 0    Associated Diagnoses: Pneumonia of both lower lobes due to infectious organism (H)      guaiFENesin (ROBITUSSIN) 20 mg/mL SOLN solution Take 10 mLs by mouth every 4 hours as needed for cough  Qty: 90 mL, Refills: 0    Associated Diagnoses: Pneumonia of both lower lobes due to infectious organism (H)      nicotine (NICODERM CQ) 21 MG/24HR 24 hr patch Place 1 patch onto the skin daily  Qty: 30 patch, Refills: 0    Associated Diagnoses: Tobacco abuse      nicotine polacrilex (NICORETTE) 4 MG gum Place 1 each (4 mg) inside cheek every hour as needed for smoking cessation  Qty: 100 each, Refills: 0    Associated Diagnoses: Tobacco abuse         CONTINUE these medications which have CHANGED    Details   albuterol (PROAIR HFA/PROVENTIL HFA/VENTOLIN HFA) 108 (90 Base) MCG/ACT inhaler Inhale 1-2 puffs into the lungs every 6 hours as needed for shortness of breath / dyspnea  or wheezing  Qty: 1 Inhaler, Refills: 3    Associated Diagnoses: COPD exacerbation (H)      albuterol (PROVENTIL) (2.5 MG/3ML) 0.083% neb solution Take 1 vial (2.5 mg) by nebulization 3 times daily as needed for shortness of breath / dyspnea or wheezing (Typically takes 1-2x daily)  Qty: 9 mL, Refills: 0    Associated Diagnoses: COPD exacerbation (H)      citalopram (CELEXA) 20 MG tablet Take 1 tablet (20 mg) by mouth daily  Qty: 7 tablet, Refills: 0    Associated Diagnoses: Mild episode of recurrent major depressive disorder (H)      gabapentin (NEURONTIN) 100 MG capsule Take 1 capsule (100 mg) by mouth 3 times daily as needed Take 1-3 capsules by mouth daily as needed.  Qty: 7 capsule, Refills: 0    Associated Diagnoses: Mild episode of recurrent major depressive disorder (H)      losartan (COZAAR) 50 MG tablet Take 1 tablet (50 mg) by mouth daily  Qty: 7 tablet, Refills: 0    Associated Diagnoses: Mild episode of recurrent major depressive disorder (H)      traZODone (DESYREL) 150 MG tablet Take 1 tablet (150 mg) by mouth nightly as needed for sleep  Qty: 5 tablet, Refills: 0    Associated Diagnoses: Mild episode of recurrent major depressive disorder (H)         CONTINUE these medications which have NOT CHANGED    Details   aspirin (ASA) 325 MG EC tablet Take 325 mg by mouth every 6 hours as needed for moderate pain      ibuprofen (ADVIL/MOTRIN) 200 MG tablet Take 200 mg by mouth every 4 hours as needed for mild pain           Allergies   Allergies   Allergen Reactions     Lisinopril Cough

## 2019-06-28 NOTE — PLAN OF CARE
VSS on RA. A/O x4. Pt independent in room. Tolerating reg diet. Pt on PO antibiotics for pneumonia. RSV swab sent- results pending. Pt currently on droplet and contact precautions. Voiding and stooling WNL. PIV SL. Possible discharge tomorrow. Will cont to monitor and follow POC.

## 2019-06-28 NOTE — PROGRESS NOTES
Patient has been assessed for Home Oxygen needs. Oxygen readings:    *Pulse oximetry (SpO2) = 91% on room air at rest while awake.    *SpO2 improved to 90-91% on no liters/minute at rest.    *SpO2 = 90-91% on room air during activity/with exercise.    *SpO2 improved to 92-93% on  No liters/minute during activity/with exercise.    At the end of the 6 mins walk in front of her room, she took a deep breath then it went up to 92-94% on room air.  In the room it went up to 94%, /86 ad HR 88.

## 2019-06-28 NOTE — PLAN OF CARE
Patient alert and oriented x4. Vitals stable on room air except for elevated /94. Up independently in room. Denies pain. Cont. to encourage ambulating and wean 02. On droplet and contact precautions . RSV results pending. Seen by RT for neb treatments. Patient slept between cares. Possible discharge today.

## 2019-06-29 VITALS
WEIGHT: 139.2 LBS | DIASTOLIC BLOOD PRESSURE: 85 MMHG | TEMPERATURE: 98.3 F | RESPIRATION RATE: 20 BRPM | SYSTOLIC BLOOD PRESSURE: 158 MMHG | HEART RATE: 73 BPM | OXYGEN SATURATION: 92 %

## 2019-06-29 LAB
ERYTHROCYTE [DISTWIDTH] IN BLOOD BY AUTOMATED COUNT: 12.6 % (ref 10–15)
HCT VFR BLD AUTO: 41.3 % (ref 35–47)
HGB BLD-MCNC: 13.4 G/DL (ref 11.7–15.7)
MCH RBC QN AUTO: 32 PG (ref 26.5–33)
MCHC RBC AUTO-ENTMCNC: 32.4 G/DL (ref 31.5–36.5)
MCV RBC AUTO: 99 FL (ref 78–100)
PLATELET # BLD AUTO: 495 10E9/L (ref 150–450)
RBC # BLD AUTO: 4.19 10E12/L (ref 3.8–5.2)
WBC # BLD AUTO: 13.8 10E9/L (ref 4–11)

## 2019-06-29 PROCEDURE — 90670 PCV13 VACCINE IM: CPT | Performed by: NURSE PRACTITIONER

## 2019-06-29 PROCEDURE — 25000125 ZZHC RX 250: Performed by: NURSE PRACTITIONER

## 2019-06-29 PROCEDURE — 36415 COLL VENOUS BLD VENIPUNCTURE: CPT | Performed by: NURSE PRACTITIONER

## 2019-06-29 PROCEDURE — 99239 HOSP IP/OBS DSCHRG MGMT >30: CPT | Performed by: INTERNAL MEDICINE

## 2019-06-29 PROCEDURE — 94640 AIRWAY INHALATION TREATMENT: CPT

## 2019-06-29 PROCEDURE — 25000132 ZZH RX MED GY IP 250 OP 250 PS 637: Performed by: NURSE PRACTITIONER

## 2019-06-29 PROCEDURE — 40000275 ZZH STATISTIC RCP TIME EA 10 MIN

## 2019-06-29 PROCEDURE — 25000131 ZZH RX MED GY IP 250 OP 636 PS 637: Performed by: NURSE PRACTITIONER

## 2019-06-29 PROCEDURE — 94640 AIRWAY INHALATION TREATMENT: CPT | Mod: 76

## 2019-06-29 PROCEDURE — 25000128 H RX IP 250 OP 636: Performed by: NURSE PRACTITIONER

## 2019-06-29 PROCEDURE — 94799 UNLISTED PULMONARY SVC/PX: CPT

## 2019-06-29 PROCEDURE — 85027 COMPLETE CBC AUTOMATED: CPT | Performed by: NURSE PRACTITIONER

## 2019-06-29 PROCEDURE — 25000132 ZZH RX MED GY IP 250 OP 250 PS 637: Performed by: PHYSICIAN ASSISTANT

## 2019-06-29 RX ORDER — LOSARTAN POTASSIUM 50 MG/1
50 TABLET ORAL DAILY
Qty: 7 TABLET | Refills: 0 | Status: SHIPPED | OUTPATIENT
Start: 2019-06-29

## 2019-06-29 RX ORDER — GABAPENTIN 100 MG/1
100 CAPSULE ORAL 3 TIMES DAILY PRN
Qty: 7 CAPSULE | Refills: 0 | Status: SHIPPED | OUTPATIENT
Start: 2019-06-29 | End: 2019-12-13

## 2019-06-29 RX ORDER — TRAZODONE HYDROCHLORIDE 150 MG/1
150 TABLET ORAL
Qty: 5 TABLET | Refills: 0 | Status: SHIPPED | OUTPATIENT
Start: 2019-06-29

## 2019-06-29 RX ORDER — GABAPENTIN 100 MG/1
100 CAPSULE ORAL 3 TIMES DAILY PRN
Qty: 7 CAPSULE | Refills: 0 | Status: SHIPPED | OUTPATIENT
Start: 2019-06-29 | End: 2019-06-29

## 2019-06-29 RX ORDER — CITALOPRAM HYDROBROMIDE 20 MG/1
20 TABLET ORAL DAILY
Qty: 7 TABLET | Refills: 0 | Status: SHIPPED | OUTPATIENT
Start: 2019-06-29 | End: 2019-12-13

## 2019-06-29 RX ADMIN — IPRATROPIUM BROMIDE AND ALBUTEROL SULFATE 3 ML: .5; 3 SOLUTION RESPIRATORY (INHALATION) at 11:44

## 2019-06-29 RX ADMIN — NICOTINE 1 PATCH: 21 PATCH TRANSDERMAL at 09:18

## 2019-06-29 RX ADMIN — PREDNISONE 40 MG: 20 TABLET ORAL at 09:17

## 2019-06-29 RX ADMIN — Medication 1 MG: at 01:12

## 2019-06-29 RX ADMIN — IPRATROPIUM BROMIDE AND ALBUTEROL SULFATE 3 ML: .5; 3 SOLUTION RESPIRATORY (INHALATION) at 01:00

## 2019-06-29 RX ADMIN — NICOTINE POLACRILEX 4 MG: 4 GUM, CHEWING ORAL at 09:20

## 2019-06-29 RX ADMIN — FLUTICASONE PROPIONATE 1 SPRAY: 50 SPRAY, METERED NASAL at 09:18

## 2019-06-29 RX ADMIN — CEFDINIR 300 MG: 300 CAPSULE ORAL at 09:17

## 2019-06-29 RX ADMIN — PNEUMOCOCCAL 13-VALENT CONJUGATE VACCINE 0.5 ML: 2.2; 2.2; 2.2; 2.2; 2.2; 4.4; 2.2; 2.2; 2.2; 2.2; 2.2; 2.2; 2.2 INJECTION, SUSPENSION INTRAMUSCULAR at 10:09

## 2019-06-29 RX ADMIN — IPRATROPIUM BROMIDE AND ALBUTEROL SULFATE 3 ML: .5; 3 SOLUTION RESPIRATORY (INHALATION) at 07:26

## 2019-06-29 RX ADMIN — GUAIFENESIN 10 ML: 100 SOLUTION ORAL at 01:12

## 2019-06-29 ASSESSMENT — ACTIVITIES OF DAILY LIVING (ADL)
ADLS_ACUITY_SCORE: 11

## 2019-06-29 NOTE — PLAN OF CARE
/89 (BP Location: Left arm)   Pulse 73   Temp 98.9  F (37.2  C) (Oral)   Resp 20   Wt 63.1 kg (139 lb 3.2 oz)   SpO2 94%     Reason for admission: URI symptoms, pneumonia.  Hx of COPD, HTN  Vitals: VSS  Activity: Up independently  Pain: Denies  Neuro: AO x 4   Cardiac: WDL  Respiratory: WDL  GI/: Voiding spontaneously.  Diet: Regular   Lines: No IV access- MD aware  Skin/Wounds: WDL   Plan: To discharge this AM.  No O2 needs at home.      Continue to monitor and follow POC    Ike Anderson RN on 6/29/2019 at 5:22 AM

## 2019-06-29 NOTE — PLAN OF CARE
Pt adequate for discharge. Vital signs stable. Pt given scheduled neb treatments. Pt reporting breathing to be good and comfortable with going home. Meds filled at discharge pharmacy and pt to pick them up on way out. Pt will follow up with PCP in one week and pulmonology for PFT. Pt left unit around 1200 with belongings.

## 2019-06-29 NOTE — PLAN OF CARE
VSS on RA. A/O x4. Independent in room. Pt tolerating reg diet. Voiding and stooling WNL. Pt has nicotine patch on L deltoid. PIV removed on shift. MD grant with no IV access. Pt will discharge in AM and is not currently on any IV medications. Home O2 test done today. PRN nicorette gum given throughout shift. Will cont to monitor and follow POC.

## 2019-06-30 ENCOUNTER — PATIENT OUTREACH (OUTPATIENT)
Dept: CARE COORDINATION | Facility: CLINIC | Age: 64
End: 2019-06-30

## 2019-06-30 LAB
BACTERIA SPEC CULT: NO GROWTH
Lab: NORMAL
SPECIMEN SOURCE: NORMAL

## 2019-07-01 ENCOUNTER — TELEPHONE (OUTPATIENT)
Dept: RESPIRATORY THERAPY | Facility: CLINIC | Age: 64
End: 2019-07-01

## 2019-07-01 NOTE — TELEPHONE ENCOUNTER
Attempted to reach Ms. Reynolds as part of [facility]'s post-discharge COPD Education follow-up. Patient didn't answer her phone. Left voicemail, instructing patient to call back with questions and concerns. Will try to reach patient again in 5 days if we don't hear back from her.    Wilfrid Callahan, RRT  Chronic Pulmonary Disease Specialist  Office: [phone]  Pager: [phone]

## 2019-07-08 ENCOUNTER — TELEPHONE (OUTPATIENT)
Dept: RESPIRATORY THERAPY | Facility: CLINIC | Age: 64
End: 2019-07-08

## 2019-07-08 NOTE — TELEPHONE ENCOUNTER
Attempted to reach Ms. Reynolds as part of [facility]'s post-discharge COPD Education follow-up. Patient didn't answer her phone. Left voicemail, instructing patient to call back with questions and concerns. Will try to reach patient again in next week.     Wilfrid Callahan, ALBA  Chronic Pulmonary Disease Specialist  Cardiopulmonary Services   Office: [phone]  Pager: [phone]

## 2019-07-15 ENCOUNTER — TELEPHONE (OUTPATIENT)
Dept: RESPIRATORY THERAPY | Facility: CLINIC | Age: 64
End: 2019-07-15

## 2019-07-30 ENCOUNTER — TELEPHONE (OUTPATIENT)
Dept: RESPIRATORY THERAPY | Facility: CLINIC | Age: 64
End: 2019-07-30

## 2019-07-30 NOTE — TELEPHONE ENCOUNTER
Attempted to reach Ms. Reynolds for ongoing COPD Education. Patient didn't answer her phone. Left voicemail, instructing patient to call back with questions and concerns. Will try to reach out to patient again in two weeks. We have now called the patient 5 times since hospital discharge, none answered. Will stop follow-up calls going forward.      Wilfrid Callahan, ALBA  Chronic Pulmonary Disease Specialist  Cardiopulmonary Services   Office: [phone]  Pager: [phone]

## 2019-12-12 ENCOUNTER — APPOINTMENT (OUTPATIENT)
Dept: GENERAL RADIOLOGY | Facility: CLINIC | Age: 64
End: 2019-12-12
Attending: EMERGENCY MEDICINE
Payer: COMMERCIAL

## 2019-12-12 ENCOUNTER — APPOINTMENT (OUTPATIENT)
Dept: CT IMAGING | Facility: CLINIC | Age: 64
End: 2019-12-12
Attending: EMERGENCY MEDICINE
Payer: COMMERCIAL

## 2019-12-12 ENCOUNTER — HOSPITAL ENCOUNTER (OUTPATIENT)
Facility: CLINIC | Age: 64
Setting detail: OBSERVATION
Discharge: HOME | End: 2019-12-14
Attending: EMERGENCY MEDICINE | Admitting: EMERGENCY MEDICINE
Payer: COMMERCIAL

## 2019-12-12 DIAGNOSIS — F17.210 CIGARETTE SMOKER: ICD-10-CM
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
## 2019-12-12 DIAGNOSIS — M54.2 CERVICALGIA: ICD-10-CM

## 2019-12-12 DIAGNOSIS — J44.1 COPD EXACERBATION (H): Primary | ICD-10-CM

## 2019-12-12 DIAGNOSIS — R55 SYNCOPE, UNSPECIFIED SYNCOPE TYPE: ICD-10-CM

## 2019-12-12 LAB
ALBUMIN SERPL-MCNC: 4 G/DL (ref 3.4–5)
ALP SERPL-CCNC: 121 U/L (ref 40–150)
ALT SERPL W P-5'-P-CCNC: 72 U/L (ref 0–50)
ANION GAP SERPL CALCULATED.3IONS-SCNC: 12 MMOL/L (ref 3–14)
AST SERPL W P-5'-P-CCNC: 75 U/L (ref 0–45)
BASOPHILS # BLD AUTO: 0 10E9/L (ref 0–0.2)
BASOPHILS NFR BLD AUTO: 0.3 %
BILIRUB SERPL-MCNC: 0.3 MG/DL (ref 0.2–1.3)
BUN SERPL-MCNC: 12 MG/DL (ref 7–30)
CALCIUM SERPL-MCNC: 9.2 MG/DL (ref 8.5–10.1)
CHLORIDE SERPL-SCNC: 98 MMOL/L (ref 94–109)
CO2 SERPL-SCNC: 23 MMOL/L (ref 20–32)
CREAT SERPL-MCNC: 1.02 MG/DL (ref 0.52–1.04)
DIFFERENTIAL METHOD BLD: NORMAL
EOSINOPHIL # BLD AUTO: 0 10E9/L (ref 0–0.7)
EOSINOPHIL NFR BLD AUTO: 0.2 %
ERYTHROCYTE [DISTWIDTH] IN BLOOD BY AUTOMATED COUNT: 12.8 % (ref 10–15)
FLUAV+FLUBV AG SPEC QL: NEGATIVE
FLUAV+FLUBV AG SPEC QL: NEGATIVE
GFR SERPL CREATININE-BSD FRML MDRD: 58 ML/MIN/{1.73_M2}
GLUCOSE SERPL-MCNC: 109 MG/DL (ref 70–99)
HCT VFR BLD AUTO: 40.5 % (ref 35–47)
HGB BLD-MCNC: 13.1 G/DL (ref 11.7–15.7)
IMM GRANULOCYTES # BLD: 0.1 10E9/L (ref 0–0.4)
IMM GRANULOCYTES NFR BLD: 0.6 %
LYMPHOCYTES # BLD AUTO: 1.3 10E9/L (ref 0.8–5.3)
LYMPHOCYTES NFR BLD AUTO: 13.2 %
MCH RBC QN AUTO: 31.1 PG (ref 26.5–33)
MCHC RBC AUTO-ENTMCNC: 32.3 G/DL (ref 31.5–36.5)
MCV RBC AUTO: 96 FL (ref 78–100)
MONOCYTES # BLD AUTO: 0.7 10E9/L (ref 0–1.3)
MONOCYTES NFR BLD AUTO: 6.5 %
NEUTROPHILS # BLD AUTO: 7.9 10E9/L (ref 1.6–8.3)
NEUTROPHILS NFR BLD AUTO: 79.2 %
NRBC # BLD AUTO: 0 10*3/UL
NRBC BLD AUTO-RTO: 0 /100
PLATELET # BLD AUTO: 389 10E9/L (ref 150–450)
POTASSIUM SERPL-SCNC: 3.7 MMOL/L (ref 3.4–5.3)
PROT SERPL-MCNC: 7.4 G/DL (ref 6.8–8.8)
RBC # BLD AUTO: 4.21 10E12/L (ref 3.8–5.2)
SODIUM SERPL-SCNC: 133 MMOL/L (ref 133–144)
SPECIMEN SOURCE: NORMAL
TROPONIN I SERPL-MCNC: <0.015 UG/L (ref 0–0.04)
WBC # BLD AUTO: 10 10E9/L (ref 4–11)

## 2019-12-12 PROCEDURE — 84484 ASSAY OF TROPONIN QUANT: CPT | Performed by: EMERGENCY MEDICINE

## 2019-12-12 PROCEDURE — 94640 AIRWAY INHALATION TREATMENT: CPT | Mod: 76 | Performed by: EMERGENCY MEDICINE

## 2019-12-12 PROCEDURE — 99220 ZZC INITIAL OBSERVATION CARE,LEVL III: CPT | Mod: Z6 | Performed by: PHYSICIAN ASSISTANT

## 2019-12-12 PROCEDURE — 71046 X-RAY EXAM CHEST 2 VIEWS: CPT

## 2019-12-12 PROCEDURE — 80053 COMPREHEN METABOLIC PANEL: CPT | Performed by: EMERGENCY MEDICINE

## 2019-12-12 PROCEDURE — 93010 ELECTROCARDIOGRAM REPORT: CPT | Mod: Z6 | Performed by: EMERGENCY MEDICINE

## 2019-12-12 PROCEDURE — 85025 COMPLETE CBC W/AUTO DIFF WBC: CPT | Performed by: EMERGENCY MEDICINE

## 2019-12-12 PROCEDURE — 87804 INFLUENZA ASSAY W/OPTIC: CPT | Mod: 59 | Performed by: EMERGENCY MEDICINE

## 2019-12-12 PROCEDURE — 70450 CT HEAD/BRAIN W/O DYE: CPT

## 2019-12-12 PROCEDURE — 99285 EMERGENCY DEPT VISIT HI MDM: CPT | Mod: 25 | Performed by: EMERGENCY MEDICINE

## 2019-12-12 PROCEDURE — 72125 CT NECK SPINE W/O DYE: CPT

## 2019-12-12 PROCEDURE — 93005 ELECTROCARDIOGRAM TRACING: CPT | Performed by: EMERGENCY MEDICINE

## 2019-12-12 RX ORDER — PREDNISONE 20 MG/1
40 TABLET ORAL ONCE
Status: COMPLETED | OUTPATIENT
Start: 2019-12-12 | End: 2019-12-13

## 2019-12-12 ASSESSMENT — ENCOUNTER SYMPTOMS
NECK PAIN: 1
ABDOMINAL PAIN: 0
HEADACHES: 0
CONFUSION: 0
DIZZINESS: 1
ARTHRALGIAS: 0
COUGH: 1
NAUSEA: 1
FEVER: 0
NECK STIFFNESS: 0
DIFFICULTY URINATING: 0
COLOR CHANGE: 0
EYE REDNESS: 0
SHORTNESS OF BREATH: 0
SHORTNESS OF BREATH: 1

## 2019-12-13 ENCOUNTER — APPOINTMENT (OUTPATIENT)
Dept: CARDIOLOGY | Facility: CLINIC | Age: 64
End: 2019-12-13
Payer: COMMERCIAL

## 2019-12-13 PROBLEM — R55 SYNCOPE: Status: ACTIVE | Noted: 2019-12-13

## 2019-12-13 LAB
ALBUMIN UR-MCNC: 10 MG/DL
APPEARANCE UR: CLEAR
BILIRUB UR QL STRIP: NEGATIVE
COLOR UR AUTO: YELLOW
GLUCOSE UR STRIP-MCNC: NEGATIVE MG/DL
HGB UR QL STRIP: ABNORMAL
INTERPRETATION ECG - MUSE: NORMAL
KETONES UR STRIP-MCNC: 40 MG/DL
LEUKOCYTE ESTERASE UR QL STRIP: NEGATIVE
NITRATE UR QL: NEGATIVE
PH UR STRIP: 5.5 PH (ref 5–7)
RBC #/AREA URNS AUTO: 1 /HPF (ref 0–2)
SOURCE: ABNORMAL
SP GR UR STRIP: 1.02 (ref 1–1.03)
SQUAMOUS #/AREA URNS AUTO: <1 /HPF (ref 0–1)
TROPONIN I SERPL-MCNC: <0.015 UG/L (ref 0–0.04)
UROBILINOGEN UR STRIP-MCNC: NORMAL MG/DL (ref 0–2)
WBC #/AREA URNS AUTO: <1 /HPF (ref 0–5)

## 2019-12-13 PROCEDURE — 25000125 ZZHC RX 250: Performed by: PHYSICIAN ASSISTANT

## 2019-12-13 PROCEDURE — 25000131 ZZH RX MED GY IP 250 OP 636 PS 637: Performed by: EMERGENCY MEDICINE

## 2019-12-13 PROCEDURE — G0378 HOSPITAL OBSERVATION PER HR: HCPCS

## 2019-12-13 PROCEDURE — 96365 THER/PROPH/DIAG IV INF INIT: CPT | Mod: 59 | Performed by: EMERGENCY MEDICINE

## 2019-12-13 PROCEDURE — 40000275 ZZH STATISTIC RCP TIME EA 10 MIN

## 2019-12-13 PROCEDURE — 25000132 ZZH RX MED GY IP 250 OP 250 PS 637: Performed by: PHYSICIAN ASSISTANT

## 2019-12-13 PROCEDURE — 93306 TTE W/DOPPLER COMPLETE: CPT | Mod: 26 | Performed by: INTERNAL MEDICINE

## 2019-12-13 PROCEDURE — 25000128 H RX IP 250 OP 636: Performed by: PHYSICIAN ASSISTANT

## 2019-12-13 PROCEDURE — 96361 HYDRATE IV INFUSION ADD-ON: CPT | Performed by: EMERGENCY MEDICINE

## 2019-12-13 PROCEDURE — 99225 ZZC SUBSEQUENT OBSERVATION CARE,LEVEL II: CPT | Mod: Z6 | Performed by: PHYSICIAN ASSISTANT

## 2019-12-13 PROCEDURE — 94640 AIRWAY INHALATION TREATMENT: CPT

## 2019-12-13 PROCEDURE — 94640 AIRWAY INHALATION TREATMENT: CPT | Mod: 76

## 2019-12-13 PROCEDURE — 93306 TTE W/DOPPLER COMPLETE: CPT

## 2019-12-13 PROCEDURE — 25800030 ZZH RX IP 258 OP 636: Performed by: PHYSICIAN ASSISTANT

## 2019-12-13 PROCEDURE — 81001 URINALYSIS AUTO W/SCOPE: CPT | Performed by: PHYSICIAN ASSISTANT

## 2019-12-13 PROCEDURE — 25000131 ZZH RX MED GY IP 250 OP 636 PS 637: Performed by: PHYSICIAN ASSISTANT

## 2019-12-13 PROCEDURE — 84484 ASSAY OF TROPONIN QUANT: CPT | Performed by: PHYSICIAN ASSISTANT

## 2019-12-13 RX ORDER — ONDANSETRON 4 MG/1
4 TABLET, ORALLY DISINTEGRATING ORAL EVERY 6 HOURS PRN
Status: DISCONTINUED | OUTPATIENT
Start: 2019-12-13 | End: 2019-12-14 | Stop reason: HOSPADM

## 2019-12-13 RX ORDER — IPRATROPIUM BROMIDE AND ALBUTEROL SULFATE 2.5; .5 MG/3ML; MG/3ML
3 SOLUTION RESPIRATORY (INHALATION) ONCE
Status: COMPLETED | OUTPATIENT
Start: 2019-12-13 | End: 2019-12-13

## 2019-12-13 RX ORDER — CYCLOBENZAPRINE HCL 5 MG
5 TABLET ORAL ONCE
Status: COMPLETED | OUTPATIENT
Start: 2019-12-13 | End: 2019-12-13

## 2019-12-13 RX ORDER — BUDESONIDE 1 MG/2ML
1 INHALANT ORAL 2 TIMES DAILY
Status: DISCONTINUED | OUTPATIENT
Start: 2019-12-13 | End: 2019-12-14 | Stop reason: HOSPADM

## 2019-12-13 RX ORDER — OXYCODONE HYDROCHLORIDE 5 MG/1
5 TABLET ORAL ONCE
Status: COMPLETED | OUTPATIENT
Start: 2019-12-13 | End: 2019-12-13

## 2019-12-13 RX ORDER — CEFTRIAXONE 1 G/1
1 INJECTION, POWDER, FOR SOLUTION INTRAMUSCULAR; INTRAVENOUS EVERY 24 HOURS
Status: DISCONTINUED | OUTPATIENT
Start: 2019-12-13 | End: 2019-12-14 | Stop reason: HOSPADM

## 2019-12-13 RX ORDER — PREDNISONE 20 MG/1
40 TABLET ORAL DAILY
Status: DISCONTINUED | OUTPATIENT
Start: 2019-12-13 | End: 2019-12-14 | Stop reason: HOSPADM

## 2019-12-13 RX ORDER — NICOTINE 21 MG/24HR
1 PATCH, TRANSDERMAL 24 HOURS TRANSDERMAL DAILY
Status: DISCONTINUED | OUTPATIENT
Start: 2019-12-13 | End: 2019-12-14 | Stop reason: HOSPADM

## 2019-12-13 RX ORDER — OXYCODONE HYDROCHLORIDE 5 MG/1
5 TABLET ORAL EVERY 4 HOURS PRN
Status: DISCONTINUED | OUTPATIENT
Start: 2019-12-13 | End: 2019-12-14 | Stop reason: HOSPADM

## 2019-12-13 RX ORDER — ALBUTEROL SULFATE 0.83 MG/ML
2.5 SOLUTION RESPIRATORY (INHALATION)
Status: DISCONTINUED | OUTPATIENT
Start: 2019-12-13 | End: 2019-12-14 | Stop reason: HOSPADM

## 2019-12-13 RX ORDER — AZITHROMYCIN 250 MG/1
500 TABLET, FILM COATED ORAL ONCE
Status: COMPLETED | OUTPATIENT
Start: 2019-12-13 | End: 2019-12-13

## 2019-12-13 RX ORDER — CITALOPRAM HYDROBROMIDE 20 MG/1
40 TABLET ORAL DAILY
Status: DISCONTINUED | OUTPATIENT
Start: 2019-12-13 | End: 2019-12-14 | Stop reason: HOSPADM

## 2019-12-13 RX ORDER — LIDOCAINE 4 G/G
2 PATCH TOPICAL
Status: DISCONTINUED | OUTPATIENT
Start: 2019-12-13 | End: 2019-12-14 | Stop reason: HOSPADM

## 2019-12-13 RX ORDER — CITALOPRAM HYDROBROMIDE 40 MG/1
40 TABLET ORAL DAILY
COMMUNITY

## 2019-12-13 RX ORDER — IPRATROPIUM BROMIDE AND ALBUTEROL SULFATE 2.5; .5 MG/3ML; MG/3ML
3 SOLUTION RESPIRATORY (INHALATION)
Status: DISCONTINUED | OUTPATIENT
Start: 2019-12-13 | End: 2019-12-13

## 2019-12-13 RX ORDER — GABAPENTIN 600 MG/1
600 TABLET ORAL 3 TIMES DAILY PRN
COMMUNITY

## 2019-12-13 RX ORDER — IPRATROPIUM BROMIDE AND ALBUTEROL SULFATE 2.5; .5 MG/3ML; MG/3ML
3 SOLUTION RESPIRATORY (INHALATION)
Status: DISCONTINUED | OUTPATIENT
Start: 2019-12-13 | End: 2019-12-14 | Stop reason: HOSPADM

## 2019-12-13 RX ORDER — SODIUM CHLORIDE 9 MG/ML
INJECTION, SOLUTION INTRAVENOUS CONTINUOUS
Status: DISCONTINUED | OUTPATIENT
Start: 2019-12-13 | End: 2019-12-14 | Stop reason: HOSPADM

## 2019-12-13 RX ORDER — GABAPENTIN 100 MG/1
100 CAPSULE ORAL 3 TIMES DAILY PRN
Status: DISCONTINUED | OUTPATIENT
Start: 2019-12-13 | End: 2019-12-14 | Stop reason: HOSPADM

## 2019-12-13 RX ORDER — NITROGLYCERIN 0.4 MG/1
0.4 TABLET SUBLINGUAL EVERY 5 MIN PRN
Status: DISCONTINUED | OUTPATIENT
Start: 2019-12-13 | End: 2019-12-14 | Stop reason: HOSPADM

## 2019-12-13 RX ORDER — LIDOCAINE 40 MG/G
CREAM TOPICAL
Status: DISCONTINUED | OUTPATIENT
Start: 2019-12-13 | End: 2019-12-14 | Stop reason: HOSPADM

## 2019-12-13 RX ORDER — ONDANSETRON 2 MG/ML
4 INJECTION INTRAMUSCULAR; INTRAVENOUS EVERY 6 HOURS PRN
Status: DISCONTINUED | OUTPATIENT
Start: 2019-12-13 | End: 2019-12-14 | Stop reason: HOSPADM

## 2019-12-13 RX ORDER — CYCLOBENZAPRINE HCL 5 MG
5-10 TABLET ORAL 3 TIMES DAILY PRN
Status: DISCONTINUED | OUTPATIENT
Start: 2019-12-13 | End: 2019-12-14 | Stop reason: HOSPADM

## 2019-12-13 RX ORDER — ACETAMINOPHEN 500 MG
1000 TABLET ORAL EVERY 6 HOURS PRN
Status: DISCONTINUED | OUTPATIENT
Start: 2019-12-13 | End: 2019-12-13

## 2019-12-13 RX ORDER — LOSARTAN POTASSIUM 50 MG/1
50 TABLET ORAL DAILY
Status: DISCONTINUED | OUTPATIENT
Start: 2019-12-13 | End: 2019-12-14 | Stop reason: HOSPADM

## 2019-12-13 RX ADMIN — IPRATROPIUM BROMIDE AND ALBUTEROL SULFATE 3 ML: .5; 3 SOLUTION RESPIRATORY (INHALATION) at 06:34

## 2019-12-13 RX ADMIN — OXYCODONE HYDROCHLORIDE 5 MG: 5 TABLET ORAL at 20:34

## 2019-12-13 RX ADMIN — LOSARTAN POTASSIUM 50 MG: 50 TABLET, FILM COATED ORAL at 08:38

## 2019-12-13 RX ADMIN — NICOTINE 1 PATCH: 21 PATCH TRANSDERMAL at 05:17

## 2019-12-13 RX ADMIN — IPRATROPIUM BROMIDE AND ALBUTEROL SULFATE 3 ML: .5; 3 SOLUTION RESPIRATORY (INHALATION) at 08:38

## 2019-12-13 RX ADMIN — IPRATROPIUM BROMIDE AND ALBUTEROL SULFATE 3 ML: .5; 3 SOLUTION RESPIRATORY (INHALATION) at 16:44

## 2019-12-13 RX ADMIN — OXYCODONE HYDROCHLORIDE 5 MG: 5 TABLET ORAL at 03:58

## 2019-12-13 RX ADMIN — SODIUM CHLORIDE 1000 ML: 9 INJECTION, SOLUTION INTRAVENOUS at 06:32

## 2019-12-13 RX ADMIN — AZITHROMYCIN MONOHYDRATE 500 MG: 250 TABLET ORAL at 03:54

## 2019-12-13 RX ADMIN — IPRATROPIUM BROMIDE AND ALBUTEROL SULFATE 3 ML: .5; 3 SOLUTION RESPIRATORY (INHALATION) at 19:59

## 2019-12-13 RX ADMIN — PREDNISONE 40 MG: 20 TABLET ORAL at 00:12

## 2019-12-13 RX ADMIN — NICOTINE 1 PATCH: 21 PATCH TRANSDERMAL at 20:22

## 2019-12-13 RX ADMIN — LIDOCAINE 2 PATCH: 560 PATCH PERCUTANEOUS; TOPICAL; TRANSDERMAL at 09:25

## 2019-12-13 RX ADMIN — BUDESONIDE 1 MG: 1 SUSPENSION RESPIRATORY (INHALATION) at 08:38

## 2019-12-13 RX ADMIN — CEFTRIAXONE 1 G: 1 INJECTION, POWDER, FOR SOLUTION INTRAMUSCULAR; INTRAVENOUS at 04:52

## 2019-12-13 RX ADMIN — SODIUM CHLORIDE: 9 INJECTION, SOLUTION INTRAVENOUS at 08:51

## 2019-12-13 RX ADMIN — CYCLOBENZAPRINE HYDROCHLORIDE 5 MG: 5 TABLET, FILM COATED ORAL at 03:55

## 2019-12-13 RX ADMIN — IPRATROPIUM BROMIDE AND ALBUTEROL SULFATE 3 ML: .5; 3 SOLUTION RESPIRATORY (INHALATION) at 09:41

## 2019-12-13 RX ADMIN — IPRATROPIUM BROMIDE AND ALBUTEROL SULFATE 3 ML: .5; 3 SOLUTION RESPIRATORY (INHALATION) at 04:12

## 2019-12-13 RX ADMIN — PREDNISONE 40 MG: 20 TABLET ORAL at 14:20

## 2019-12-13 RX ADMIN — BUDESONIDE 1 MG: 1 SUSPENSION RESPIRATORY (INHALATION) at 20:00

## 2019-12-13 RX ADMIN — TRAZODONE HYDROCHLORIDE 150 MG: 100 TABLET ORAL at 23:23

## 2019-12-13 RX ADMIN — SODIUM CHLORIDE: 9 INJECTION, SOLUTION INTRAVENOUS at 20:23

## 2019-12-13 RX ADMIN — CITALOPRAM HYDROBROMIDE 40 MG: 40 TABLET ORAL at 08:38

## 2019-12-13 NOTE — ED PROVIDER NOTES
[place] EMERGENCY DEPARTMENT ([facility])  December 12, 2019    History     Chief Complaint   Patient presents with     Shortness of Breath     Loss of Consciousness     The history is provided by the patient and medical records.     Teetee Reynolds is a 64 year old female with a history of COPD (on albuterol), HTN, and anxiety who presents to the ED accompanied by family member/friend for evaluation following a syncopal episode. Patient states that she was shoveling snow outside earlier today when she began to develop severe dizziness (lasting 5-10 minutes), fatigue, and shortness of breath. Patient reports that she decided to return home and then fainted. Per her report, her loss of consciousness reportedly lasted seconds. Patient reports that she was standing at the time and fell backwards landing on her back and head. She notes that this is the first time this has occured.        Patient also complains of a nonproductive cough and endorses some associated abdominal pain. She has a history of COPD and has been taking her medications regularly. Here in the ED, she endorses neck pain secondary to the cervical collar and feeling nauseous and dizzy. No other symptoms noted. Patient states that she has a history of slight murmur. She endorses a family history of family disease but denies any history of arrythmias. No other symptoms noted.       PAST MEDICAL HISTORY  Past Medical History:   Diagnosis Date     Anxiety      COPD (chronic obstructive pulmonary disease) (H)      Depressive disorder      Hypertension      PAST SURGICAL HISTORY  History reviewed. No pertinent surgical history.  FAMILY HISTORY  History reviewed. No pertinent family history.  SOCIAL HISTORY  Social History     Tobacco Use     Smoking status: Former Smoker     Packs/day: 1.00     Years: 28.00     Pack years: 28.00     Types: Cigarettes     Start date: 1/1/1987     Smokeless tobacco: Never Used   Substance Use Topics     Alcohol use: Not  on file     MEDICATIONS  No current facility-administered medications for this encounter.      Current Outpatient Medications   Medication     albuterol (PROAIR HFA/PROVENTIL HFA/VENTOLIN HFA) 108 (90 Base) MCG/ACT inhaler     albuterol (PROVENTIL) (2.5 MG/3ML) 0.083% neb solution     aspirin (ASA) 325 MG EC tablet     benzonatate (TESSALON) 100 MG capsule     citalopram (CELEXA) 20 MG tablet     fluticasone (FLONASE) 50 MCG/ACT nasal spray     gabapentin (NEURONTIN) 100 MG capsule     guaiFENesin (ROBITUSSIN) 20 mg/mL SOLN solution     ibuprofen (ADVIL/MOTRIN) 200 MG tablet     losartan (COZAAR) 50 MG tablet     nicotine (NICODERM CQ) 21 MG/24HR 24 hr patch     nicotine polacrilex (NICORETTE) 4 MG gum     traZODone (DESYREL) 150 MG tablet     ALLERGIES  Allergies   Allergen Reactions     Lisinopril Cough       I have reviewed the Medications, Allergies, Past Medical and Surgical History, and Social History in the Epic system.    Review of Systems   Constitutional: Negative for fever.   HENT: Negative for congestion.    Eyes: Negative for redness.   Respiratory: Negative for shortness of breath.    Cardiovascular: Negative for chest pain.   Gastrointestinal: Positive for nausea. Negative for abdominal pain.   Genitourinary: Negative for difficulty urinating.   Musculoskeletal: Positive for neck pain. Negative for arthralgias and neck stiffness.   Skin: Negative for color change.   Neurological: Positive for dizziness and syncope. Negative for headaches.   Psychiatric/Behavioral: Negative for confusion.   All other systems reviewed and are negative.      Physical Exam   BP: 119/75  Pulse: 102  Temp: 99.3  F (37.4  C)  Weight: 70.3 kg (154 lb 14.4 oz)  SpO2: 92 %      Physical Exam  Vitals signs and nursing note reviewed.   Constitutional:       General: She is not in acute distress.     Appearance: She is well-developed. She is not diaphoretic.   HENT:      Head: Normocephalic and atraumatic.      Mouth/Throat:       Pharynx: No oropharyngeal exudate.   Eyes:      General: No scleral icterus.        Right eye: No discharge.         Left eye: No discharge.      Pupils: Pupils are equal, round, and reactive to light.   Neck:      Musculoskeletal: Normal range of motion and neck supple. No spinous process tenderness.   Cardiovascular:      Rate and Rhythm: Normal rate and regular rhythm.      Heart sounds: Normal heart sounds. No murmur. No friction rub. No gallop.    Pulmonary:      Effort: Pulmonary effort is normal. No respiratory distress.      Breath sounds: Normal breath sounds. No wheezing.   Chest:      Chest wall: No tenderness.   Abdominal:      General: Bowel sounds are normal. There is no distension.      Palpations: Abdomen is soft.      Tenderness: There is no abdominal tenderness.   Musculoskeletal: Normal range of motion.         General: No tenderness or deformity.   Skin:     General: Skin is warm and dry.      Coloration: Skin is not pale.      Findings: No erythema or rash.   Neurological:      General: No focal deficit present.      Mental Status: She is alert and oriented to person, place, and time.      Cranial Nerves: No cranial nerve deficit.      Motor: No weakness.         ED Course   9:13 PM  The patient was seen and examined by Apollo Richards DO in Room ED21.        Procedures             EKG Interpretation:      Interpreted by Apollo Richards DO  Time reviewed: 2005  Symptoms at time of EKG: None   Rhythm: normal sinus   Rate: 84  Axis: Normal  Ectopy: none  Conduction: normal  ST Segments/ T Waves: No ST-T wave changes  Q Waves: none  Comparison to prior: No old EKG available    Clinical Impression: no acute changes                Critical Care time:  none             Labs Ordered and Resulted from Time of ED Arrival Up to the Time of Departure from the ED   CBC WITH PLATELETS DIFFERENTIAL   COMPREHENSIVE METABOLIC PANEL   INFLUENZA A/B ANTIGEN            Assessments & Plan (with Medical Decision  Lety)   Is a 64-year-old female who presents after an episode of syncope.  This occurred while she was shoveling.  She did feel lightheaded short of breath several minutes prior to this event.  This was witnessed by her sister states she fell straight backwards.  In the emergency department patient is currently at baseline.  She states her COPD symptoms have been worse in the past 2 weeks.  Exam demonstrate no acute abnormalities.  There is no midline cervical tenderness.  He has no focal neuro deficits.  ECG shows no acute abnormalities.  CT of her head and C-spine show no acute abnormalities.  Lab work shows no acute abnormalities.  Patient was given prednisone as she has had worsening of her COPD recently.  Patient has not had any previous episodes of syncope in the past and with this occurring with exertion I believe she does need further work-up for this.  We will admit to observation for further monitoring work-up and treatment.    I have reviewed the nursing notes.    I have reviewed the findings, diagnosis, plan and need for follow up with the patient.    New Prescriptions    No medications on file       Final diagnoses:   None     London GOLDSTEIN, am serving as a trained medical scribe to document services personally performed by Apollo Richards DO, based on the provider's statements to me.      Apollo GOLDSTEIN DO, was physically present and have reviewed and verified the accuracy of this note documented by London Leyva.    12/12/2019   [facility], EMERGENCY DEPARTMENT     Apollo Richards DO  12/13/19 0029

## 2019-12-13 NOTE — ED NOTES
[facility], [place]   ED Nurse to Floor Handoff     Teetee Reynolds is a 64 year old female who speaks English and lives alone,  in a home  They arrived in the ED by car from home    ED Chief Complaint: Shortness of Breath and Loss of Consciousness    ED Dx;   Final diagnoses:   None         Needed?: No    Allergies:   Allergies   Allergen Reactions     Lisinopril Cough   .  Past Medical Hx:   Past Medical History:   Diagnosis Date     Anxiety      COPD (chronic obstructive pulmonary disease) (H)      Depressive disorder      Hypertension       Baseline Mental status: WDL  Current Mental Status changes: at basesline    Infection present or suspected this encounter: no  Sepsis suspected: No  Isolation type: Contact     Activity level - Baseline/Home:  Stand with Assist  Activity Level - Current:   Stand with Assist    Bariatric equipment needed?: No    In the ED these meds were given: Medications - No data to display    Drips running?  No    Home pump  No    Current LDAs  Peripheral IV 12/12/19 Right Lower forearm (Active)   Site Assessment WDL 12/12/2019  7:41 PM   Line Status Saline locked 12/12/2019  7:41 PM   Phlebitis Scale 0-->no symptoms 12/12/2019  7:41 PM   Infiltration Scale 0 12/12/2019  7:41 PM   Infiltration Site Treatment Method  None 12/12/2019  7:41 PM   Extravasation? No 12/12/2019  7:41 PM   Dressing Intervention New dressing  12/12/2019  7:41 PM   Number of days: 0       Labs results:   Labs Ordered and Resulted from Time of ED Arrival Up to the Time of Departure from the ED   COMPREHENSIVE METABOLIC PANEL - Abnormal; Notable for the following components:       Result Value    Glucose 109 (*)     GFR Estimate 58 (*)     ALT 72 (*)     AST 75 (*)     All other components within normal limits   CBC WITH PLATELETS DIFFERENTIAL   TROPONIN I   INFLUENZA A/B ANTIGEN       Imaging Studies:   Recent Results (from the past 24 hour(s))   XR Chest 2 Views    Narrative    Exam:   Chest X-ray 12/12/2019 8:43 PM    History: Cough, history of COPD, rule out infiltrate    Comparison: 6/25/2019    Findings: PA and lateral chest radiograph. Cardiac size within normal  limits. Pulmonary vasculature is distinct. No pleural effusion or  pneumothorax. No focal pulmonary opacities. Straightening of the  thoracic spine. Cervical fusion hardware.      Impression    Impression: No acute cardiopulmonary abnormalities.    I have personally reviewed the examination and initial interpretation  and I agree with the findings.    LAYNE LEARY MD   Head CT w/o contrast    Narrative     CT HEAD W/O CONTRAST 12/12/2019 8:55 PM    Provided History: Head trauma, minor, GCS>=13, low clinical risk,  initial exam    Comparison: None.    Technique: Using multidetector thin collimation helical acquisition  technique, axial, coronal and sagittal CT images from the skull base  to the vertex were obtained without intravenous contrast.     Findings:    No intracranial hemorrhage, mass effect, or midline shift. The  ventricles are proportionate to the cerebral sulci. The gray to white  matter differentiation of the cerebral hemispheres is preserved. The  basal cisterns are patent. Bilateral basal ganglia calcifications.    The bony calvarium and the bones and skull base are intact. Mucosal  thickening of the paranasal sinuses. The mastoid air cells are clear.  The orbits are unremarkable.       Impression    Impression:No acute intracranial pathology.    I have personally reviewed the examination and initial interpretation  and I agree with the findings.    ELIJAH UMNIZ MD   Cervical spine CT w/o contrast    Narrative    CT CERVICAL SPINE W/O CONTRAST 12/12/2019 8:56 PM    Provided History: C-spine trauma, low clinical risk (NEXUS/CCR);  Syncopal episode, struck head, left-sided and central neck pain    Comparison: None    Technique: Using multidetector thin collimation helical acquisition  technique, axial,  coronal and sagittal CT images through the cervical  spine were obtained without intravenous contrast.     Findings:  Postsurgical changes of anterior cervical discectomy and fusion  spanning C4-C7. The hardware appears intact. No evidence of acute  fracture or subluxation. There is disc space narrowing at C7-T1 and  T1-T2.. No prevertebral edema.     The findings on a level by level basis are as follows:    C2-3: No spinal canal or neural foraminal stenosis.    C3-4:  Right uncinate spurring. Moderate neuroforaminal narrowing. No  spinal canal stenosis.    C4-5:  Bilateral facet arthropathy and uncinate spurring, resulting in  mild bilateral neuroforaminal narrowing. No spinal canal stenosis.    C5-6:  Bilateral facet arthropathy and uncinate spurring, resulting in  mild right and moderate left neuroforaminal narrowing. No spinal canal  stenosis.    C6-7:  Bilateral facet arthropathy and uncinate spurring, resulting in  moderate bilateral neuroforaminal narrowing. No spinal canal stenosis.    C7-T1:  Right uncinate spurring, resulting in moderate right  neuroforaminal narrowing. No spinal canal stenosis.     No abnormality of the paraspinous soft tissues. Visualized lung apices  are clear.      Impression    Impression:   1. No acute fracture or subluxation of the cervical spine.  2. Postsurgical changes of anterior cervical discectomy and fusion  spanning C4-C7. The hardware appears intact.     I have personally reviewed the examination and initial interpretation  and I agree with the findings.    ELIJAH MUNIZ MD       Recent vital signs:   /75   Pulse 102   Temp 99.3  F (37.4  C) (Oral)   Wt 70.3 kg (154 lb 14.4 oz)   SpO2 92%     [place] Coma Scale Score: 15 (12/12/19 2142)       Cardiac Rhythm: Normal Sinus  Pt needs tele? yes  Skin/wound Issues: None    Code Status: Full Code    Pain control: pt had none    Nausea control: pt had none    Abnormal labs/tests/findings requiring intervention:  none    Family present during ED course? Yes   Family Comments/Social Situation comments: sister at bedside and supportive     Tasks needing completion: None    Aline Briceno, RN    [phone] [facility]

## 2019-12-13 NOTE — H&P
[facility], [place]    History and Physical - ED Observation Service       Date of Admission:  12/12/2019    Assessment & Plan   Teetee Reynolds is a 64 year old female admitted on 12/12/2019. She has a history of COPD (on albuterol), HTN, and anxiety who presents to the ED accompanied by family member/friend for evaluation following a syncopal episode.    1. Syncope: Was shoveling snow outside and felt dizzy; came inside; she was standing talking to her sister and felt extremely lightheaded, nauseous, and was heading to the room to lay down but before she could get away she fell backward and hit her head on a plastic cooler and was up in just a few seconds with the help of her sister and in a chair. She states she is foggy on what happened. Soon after she had an episode of emesis. She admits to decreased po intake and appetite with her recent respiratory symptoms as well. In ED, HR 70's-90's, 's-140's/70's-100's, RR 11-30, SaO2 90-94% on RA, Temp 99.3. Labs show CMP with ALT 72, AST 75, glucose 109 otherwise normal. CBC normal with WBC 10. Troponin I negative x 1. Flu A/B negative. CT Head negative. CT C-spine no acute fracture; postsurgical changes of anterior cervical discectomy and fusion spanning C4-7; hardware appears intact. CXR negative. In the ED the patient was given . She is being admitted for syncope work up.  - Serial troponins q4h x 2 more  - Check TSH in AM  - Continuous telemetry  - Resting Echo in the morning  - Keep Mag > 2, K > 4  - Send UA  - Orthostatic vitals now and qshift  - Ambulate with assistance  - Consider discharge with Ziopatch    2. Fall/Neck Pain: fell backwards hitting the back of her head and neck on cooler. Complains of acute on chronic neck pain.  CT Head negative. CT C-spine no acute fracture; postsurgical changes of anterior cervical discectomy and fusion spanning C4-7; hardware appears intact.   - Oxycodone 5mg po q4h prn pain  - Flexeril 5mg TID  "prn  - Lidoderm patch   - OT consult    3. COPD Exacerbation:  cough, SOB for the last 2 weeks associated with subjective fever and chills, headache. She was admitted to Obs in June for COPD exacerbation and then transferred inpatient as she continued to have O2 requirement. Patient is concerned that her sisters basement may be moldy which is where she has been living since May 2019.  RR 11-30, SaO2 90-94% on RA, Temp 99.3. CBC normal with WBC 10. Flu A/B negative. CXR negative.  - Duobneb q4h  - Albuterol neb q2h prn  - Pulmicort 1mg BID (while admitted; resume Incruse Ellipta on discharge)  - Prednisone 40mg po daily  - Start Azithromycin and Rocephin    4. Tobacco Dependence: had tobacco cessation counseling at last admission in June 2019  - Nicotine patch and gum     5. Mild Transaminitis: ALT 72, AST 75. Previously normal earlier this year.   - Hold Tylenol  - Repeat in AM    Chronic Medical Problems:  # HTN: - Continue PTA Losartan 50 mg daily     # Depression/anxiety: - Continue PTA Gabapentin, Citalopram and Trazodone      Diet: regular  DVT Prophylaxis: Low Risk/Ambulatory with no VTE prophylaxis indicated  Dave Catheter: not present  Code Status: full    Disposition Plan   Expected discharge: Tomorrow, recommended to prior living arrangement once antibiotic plan established, O2 use less than 1 liters/minute and safe disposition plan/ TCU bed available.  Entered: CHARO Pickard 12/12/2019, 10:31 PM     The patient's care was discussed with the Attending Physician, Dr. Zarate.    CHARO Pickard  [facility]  Ascom: [id number]  Please see sticky note for cross cover information  ______________________________________________________________________    Chief Complaint   Syncope     History is obtained from the patient    History of Present Illness   Per ED Note: \"Teetee Reynolds is a 64 year old female with a history of COPD (on albuterol), HTN, and anxiety who " "presents to the ED accompanied by family member/friend for evaluation following a syncopal episode. Patient states that she was shoveling snow outside earlier today when she began to develop severe dizziness (lasting 5-10 minutes), fatigue, and shortness of breath. Patient reports that she decided to return home and then fainted. Per her report, her loss of consciousness reportedly lasted seconds. Patient reports that she was standing at the time and fell backwards landing on her back and head. She notes that this is the first time this has occured.         Patient also complains of a nonproductive cough and endorses some associated abdominal pain. She has a history of COPD and has been taking her medications regularly. Here in the ED, she endorses neck pain secondary to the cervical collar and feeling nauseous and dizzy. No other symptoms noted. Patient states that she has a history of slight murmur. She endorses a family history of family disease but denies any history of arrythmias. No other symptoms noted.\"    States upon return from shoveling snow outside she was standing talking to her sister and felt extremely lightheaded, nauseous, and was heading to the room to lay down but before she could get away she fell backward and hit her head on a plastic cooler and was up in just a few seconds with the help of her sister and in the chair. She states she is foggy on what happened. Soon after she had an episode of emesis. She admits to decreased po intake and appetite with her recent respiratory symptoms as well.    She also reports cough, SOB for the last 2 weeks associated with subjective fever and chills, headache. The last time she had a COPD exacerbation she was admitted to the Observation Unit and was here for 5 days. She was transferred inpatient during her stay has she continued to have an oxygen requirement. Patient is concerned that her sisters basement may be moldy which is where she has been living since " May 2019.     In the ED, HR 70's-90's, 's-140's/70's-100's, RR 11-30, SaO2 90-94% on RA, Temp 99.3. Labs show CMP with ALT 72, AST 75, glucose 109 otherwise normal. CBC normal with WBC 10. Troponin I negative x 1. Flu A/B negative. CT Head negative. CT C-spine no acute fracture; postsurgical changes of anterior cervical discectomy and fusion spanning C4-7; hardware appears intact. CXR negative. In the ED the patient was given . She is being admitted for syncope work up.      Review of Systems    All other ROS negative except those mentioned in above note.       Past Medical History    I have reviewed this patient's medical history and updated it with pertinent information if needed.   Past Medical History:   Diagnosis Date     Anxiety      COPD (chronic obstructive pulmonary disease) (H)      Depressive disorder      Hypertension        Past Surgical History   I have reviewed this patient's surgical history and updated it with pertinent information if needed.  History reviewed. No pertinent surgical history.    Social History   I have reviewed this patient's social history and updated it with pertinent information if needed.  Social History     Tobacco Use     Smoking status: Former Smoker     Packs/day: 1.00     Years: 28.00     Pack years: 28.00     Types: Cigarettes     Start date: 1/1/1987     Smokeless tobacco: Never Used   Substance Use Topics     Alcohol use: None     Drug use: None       Family History   I have reviewed this patient's family history and updated it with pertinent information if needed.   History reviewed. No pertinent family history.    Prior to Admission Medications   Prior to Admission Medications   Prescriptions Last Dose Informant Patient Reported? Taking?   albuterol (PROAIR HFA/PROVENTIL HFA/VENTOLIN HFA) 108 (90 Base) MCG/ACT inhaler   No No   Sig: Inhale 1-2 puffs into the lungs every 6 hours as needed for shortness of breath / dyspnea or wheezing   albuterol (PROVENTIL) (2.5  MG/3ML) 0.083% neb solution   No No   Sig: Take 1 vial (2.5 mg) by nebulization 3 times daily as needed for shortness of breath / dyspnea or wheezing (Typically takes 1-2x daily)   aspirin (ASA) 325 MG EC tablet   Yes No   Sig: Take 325 mg by mouth every 6 hours as needed for moderate pain   benzonatate (TESSALON) 100 MG capsule   No No   Sig: Take 1 capsule (100 mg) by mouth 3 times daily as needed for cough   cefdinir (OMNICEF) 300 MG capsule   No No   Sig: Take 1 capsule (300 mg) by mouth every 12 hours for 4 days   citalopram (CELEXA) 20 MG tablet   No No   Sig: Take 1 tablet (20 mg) by mouth daily   fluticasone (FLONASE) 50 MCG/ACT nasal spray   No No   Sig: Spray 1 spray into both nostrils daily   gabapentin (NEURONTIN) 100 MG capsule   No No   Sig: Take 1 capsule (100 mg) by mouth 3 times daily as needed   guaiFENesin (ROBITUSSIN) 20 mg/mL SOLN solution   No No   Sig: Take 10 mLs by mouth every 4 hours as needed for cough   ibuprofen (ADVIL/MOTRIN) 200 MG tablet   Yes No   Sig: Take 200 mg by mouth every 4 hours as needed for mild pain   losartan (COZAAR) 50 MG tablet   No No   Sig: Take 1 tablet (50 mg) by mouth daily   nicotine (NICODERM CQ) 21 MG/24HR 24 hr patch   No No   Sig: Place 1 patch onto the skin daily   nicotine polacrilex (NICORETTE) 4 MG gum   No No   Sig: Place 1 each (4 mg) inside cheek every hour as needed for smoking cessation   traZODone (DESYREL) 150 MG tablet   No No   Sig: Take 1 tablet (150 mg) by mouth nightly as needed for sleep      Facility-Administered Medications: None     Allergies   Allergies   Allergen Reactions     Lisinopril Cough       Physical Exam   Vital Signs: Temp: 99.3  F (37.4  C) Temp src: Oral BP: 119/75 Pulse: 102     SpO2: 92 % O2 Device: None (Room air)    Weight: 154 lbs 14.4 oz    Constitutional: awake, alert, cooperative, no apparent distress, and appears stated age  Eyes: Lids and lashes normal, pupils equal, round and reactive to light, extra ocular muscles  intact, sclera clear, conjunctiva normal  ENT: Normocephalic, without obvious abnormality, atraumatic, sinuses nontender on palpation, external ears without lesions, oral pharynx with moist mucous membranes, tonsils without erythema or exudates, gums normal and good dentition.  Hematologic / Lymphatic: no cervical lymphadenopathy  Respiratory: No increased work of breathing, good air exchange, clear to auscultation bilaterally, no crackles or wheezing  Cardiovascular: Normal apical impulse, regular rate and rhythm, normal S1 and S2, no S3 or S4, and no murmur noted  GI: No scars, normal bowel sounds, soft, non-distended, non-tender, no masses palpated, no hepatosplenomegally  Skin: no bruising or bleeding  Musculoskeletal: There is no redness, warmth, or swelling of the joints.  Full range of motion noted.  Motor strength is 5 out of 5 all extremities bilaterally.  Tone is normal.  Neurologic: Awake, alert, oriented to name, place and time.  Cranial nerves II-XII are grossly intact.  Motor is 5 out of 5 bilaterally.  Cerebellar finger to nose, heel to shin intact.  Sensory is intact.  Babinski down going, Romberg negative, and gait is normal.  Neuropsychiatric: General: normal, calm and normal eye contact    Data   Data reviewed today: I reviewed all medications, new labs and imaging results over the last 24 hours.   Recent Labs   Lab 12/13/19  0330 12/12/19 1942   WBC  --  10.0   HGB  --  13.1   MCV  --  96   PLT  --  389   NA  --  133   POTASSIUM  --  3.7   CHLORIDE  --  98   CO2  --  23   BUN  --  12   CR  --  1.02   ANIONGAP  --  12   SAL  --  9.2   GLC  --  109*   ALBUMIN  --  4.0   PROTTOTAL  --  7.4   BILITOTAL  --  0.3   ALKPHOS  --  121   ALT  --  72*   AST  --  75*   TROPI <0.015 <0.015     Most Recent 3 CBC's:  Recent Labs   Lab Test 12/12/19 1942 06/29/19  0751 06/28/19  0641   WBC 10.0 13.8* 14.0*   HGB 13.1 13.4 13.0   MCV 96 99 99    495* 441     Most Recent 3 BMP's:  Recent Labs   Lab Test  12/12/19 1942 06/27/19  0917 06/25/19  0516    138 136   POTASSIUM 3.7 3.6 3.9   CHLORIDE 98 103 102   CO2 23 29 29   BUN 12 14 11   CR 1.02 0.51* 0.43*   ANIONGAP 12 5 4   SAL 9.2 9.0 8.9   * 98 232*     Most Recent 2 LFT's:  Recent Labs   Lab Test 12/12/19 1942 06/24/19  1656   AST 75* 18   ALT 72* 28   ALKPHOS 121 87   BILITOTAL 0.3 0.5     Most Recent 3 INR's:No lab results found.  Most Recent 3 Creatinines:  Recent Labs   Lab Test 12/12/19 1942 06/27/19  0917 06/25/19  0516   CR 1.02 0.51* 0.43*     Most Recent 3 Hemoglobins:  Recent Labs   Lab Test 12/12/19 1942 06/29/19  0751 06/28/19  0641   HGB 13.1 13.4 13.0     Most Recent 3 Troponin's:  Recent Labs   Lab Test 12/13/19  0330 12/12/19 1942 06/24/19  1656   TROPI <0.015 <0.015 <0.015     Most Recent D-dimer:No lab results found.  Most Recent TSH and T4:No lab results found.  Most Recent 6 glucoses:  Recent Labs   Lab Test 12/12/19 1942 06/27/19  0917 06/25/19  0516 06/24/19  1656   * 98 232* 115*     Most Recent Urinalysis:  Recent Labs   Lab Test 12/13/19  0346   COLOR Yellow   APPEARANCE Clear   URINEGLC Negative   URINEBILI Negative   URINEKETONE 40*   SG 1.016   UBLD Small*   URINEPH 5.5   PROTEIN 10*   NITRITE Negative   LEUKEST Negative   RBCU PENDING   WBCU PENDING     Most Recent Anemia Panel:  Recent Labs   Lab Test 12/12/19 1942   WBC 10.0   HGB 13.1   HCT 40.5   MCV 96        Recent Results (from the past 24 hour(s))   XR Chest 2 Views    Narrative    Exam:  Chest X-ray 12/12/2019 8:43 PM    History: Cough, history of COPD, rule out infiltrate    Comparison: 6/25/2019    Findings: PA and lateral chest radiograph. Cardiac size within normal  limits. Pulmonary vasculature is distinct. No pleural effusion or  pneumothorax. No focal pulmonary opacities. Straightening of the  thoracic spine. Cervical fusion hardware.      Impression    Impression: No acute cardiopulmonary abnormalities.    I have personally reviewed  the examination and initial interpretation  and I agree with the findings.    LAYNE LEARY MD   Head CT w/o contrast    Narrative     CT HEAD W/O CONTRAST 12/12/2019 8:55 PM    Provided History: Head trauma, minor, GCS>=13, low clinical risk,  initial exam    Comparison: None.    Technique: Using multidetector thin collimation helical acquisition  technique, axial, coronal and sagittal CT images from the skull base  to the vertex were obtained without intravenous contrast.     Findings:    No intracranial hemorrhage, mass effect, or midline shift. The  ventricles are proportionate to the cerebral sulci. The gray to white  matter differentiation of the cerebral hemispheres is preserved. The  basal cisterns are patent. Bilateral basal ganglia calcifications.    The bony calvarium and the bones and skull base are intact. Mucosal  thickening of the paranasal sinuses. The mastoid air cells are clear.  The orbits are unremarkable.       Impression    Impression:No acute intracranial pathology.    I have personally reviewed the examination and initial interpretation  and I agree with the findings.    ELIJAH MUNIZ MD   Cervical spine CT w/o contrast    Narrative    CT CERVICAL SPINE W/O CONTRAST 12/12/2019 8:56 PM    Provided History: C-spine trauma, low clinical risk (NEXUS/CCR);  Syncopal episode, struck head, left-sided and central neck pain    Comparison: None    Technique: Using multidetector thin collimation helical acquisition  technique, axial, coronal and sagittal CT images through the cervical  spine were obtained without intravenous contrast.     Findings:  Postsurgical changes of anterior cervical discectomy and fusion  spanning C4-C7. The hardware appears intact. No evidence of acute  fracture or subluxation. There is disc space narrowing at C7-T1 and  T1-T2.. No prevertebral edema.     The findings on a level by level basis are as follows:    C2-3: No spinal canal or neural foraminal  stenosis.    C3-4:  Right uncinate spurring. Moderate neuroforaminal narrowing. No  spinal canal stenosis.    C4-5:  Bilateral facet arthropathy and uncinate spurring, resulting in  mild bilateral neuroforaminal narrowing. No spinal canal stenosis.    C5-6:  Bilateral facet arthropathy and uncinate spurring, resulting in  mild right and moderate left neuroforaminal narrowing. No spinal canal  stenosis.    C6-7:  Bilateral facet arthropathy and uncinate spurring, resulting in  moderate bilateral neuroforaminal narrowing. No spinal canal stenosis.    C7-T1:  Right uncinate spurring, resulting in moderate right  neuroforaminal narrowing. No spinal canal stenosis.     No abnormality of the paraspinous soft tissues. Visualized lung apices  are clear.      Impression    Impression:   1. No acute fracture or subluxation of the cervical spine.  2. Postsurgical changes of anterior cervical discectomy and fusion  spanning C4-C7. The hardware appears intact.     I have personally reviewed the examination and initial interpretation  and I agree with the findings.    ELIJAH MUNIZ MD

## 2019-12-13 NOTE — PHARMACY-ADMISSION MEDICATION HISTORY
Admission medication history interview status for the 2019 admission is complete. See Epic admission navigator for allergy information, pharmacy, prior to admission medications and immunization status.     Medication history interview sources:  Dispense report, Care Everywhere, and patient    Changes made to PTA medication list (reason)  Added:   -Gabapentin 600mg tablet (replaces 100mg capsule)  -Citalopram 40mg tablet (replaces 20mg tablet)  -Nicoderm CQ 7mg/24hr patch (replaces 21mg patch)  Deleted:   -Gabapentin 100mg capsule(replaced by 600mg tablet)  -Citalopram 20mg tablet (replaced by 40mg tablet)  -Nicoderm CQ 21mg/24hr patch (replaced by 7mg/24hr patch)  -Cefdinir 300mg capsule ()  -Guaifenesin 20mg/mL solution (not taking)  Changed:   -Albuterol nebulizer changed to twice daily as needed  -Aspirin changed to 2 tablets twice daily as needed  -Flonase nasal spray changed to as needed  -Citalopram changed to 40mg once daily  -Gabapentin changed to 600mg 3 times daily as needed  -Ibuprofen changed to 2 tablets twice daily as needed  -Nicoderm CQ patch changed to 7mg strength    Additional medication history information:  -Patient is a good historian  -Patient reports taking one gabapentin 600mg tablet daily and then will take up to 2 additional tablets throughout the day as needed  -Patient is wearing a nicotine patch, which was placed this morning at the hosptial      Prior to Admission medications    Medication Sig Last Dose Taking? Auth Provider   albuterol (PROAIR HFA/PROVENTIL HFA/VENTOLIN HFA) 108 (90 Base) MCG/ACT inhaler Inhale 1-2 puffs into the lungs every 6 hours as needed for shortness of breath / dyspnea or wheezing Past Week at Unknown time Yes Radha Richter, NP   albuterol (PROVENTIL) (2.5 MG/3ML) 0.083% neb solution Take 1 vial (2.5 mg) by nebulization 3 times daily as needed for shortness of breath / dyspnea or wheezing (Typically takes 1-2x daily)  Patient taking  differently: Take 2.5 mg by nebulization 2 times daily as needed for shortness of breath / dyspnea or wheezing  12/13/2019 at Unknown time Yes Radha Richter NP   aspirin (ASA) 325 MG EC tablet Take 650 mg by mouth 2 times daily as needed for moderate pain (headache)  Past Week at Unknown time Yes Unknown, Entered By History   benzonatate (TESSALON) 100 MG capsule Take 1 capsule (100 mg) by mouth 3 times daily as needed for cough  at prn Yes Radha Richter NP   citalopram (CELEXA) 40 MG tablet Take 40 mg by mouth daily 12/13/2019 at Unknown time Yes Unknown, Entered By History   fluticasone (FLONASE) 50 MCG/ACT nasal spray Spray 1 spray into both nostrils daily  Patient taking differently: Spray 1 spray into both nostrils daily as needed  Past Week at Unknown time Yes Radha Richter NP   fluticasone-salmeterol (ADVAIR DISKUS) 250-50 MCG/DOSE inhaler Inhale 1 puff into the lungs 2 times daily 12/12/2019 at Unknown time Yes Reported, Patient   gabapentin (NEURONTIN) 600 MG tablet Take 600 mg by mouth 3 times daily as needed 12/12/2019 at Unknown time Yes Unknown, Entered By History   ibuprofen (ADVIL/MOTRIN) 200 MG tablet Take 400 mg by mouth 2 times daily as needed for mild pain  Past Week at Unknown time Yes Unknown, Entered By History   losartan (COZAAR) 50 MG tablet Take 1 tablet (50 mg) by mouth daily 12/13/2019 at Unknown time Yes Radha Richter NP   nicotine (NICODERM CQ) 7 MG/24HR 24 hr patch Place 1 patch onto the skin every 24 hours 12/13/2019 at Currently wearing Yes Unknown, Entered By History   nicotine polacrilex (NICORETTE) 4 MG gum Place 1 each (4 mg) inside cheek every hour as needed for smoking cessation 12/12/2019 at Unknown time Yes Radha Richter NP   traZODone (DESYREL) 150 MG tablet Take 1 tablet (150 mg) by mouth nightly as needed for sleep Past Week at Unknown time Yes Radha Richter NP   umeclidinium (INCRUSE ELLIPTA) 62.5 MCG/INH inhaler Inhale 1 puff into the lungs  daily 12/12/2019 at Unknown time Yes Reported, Patient         Medication history completed by:   Altagracia Laurent  PharmD Candidate  December 13, 2019

## 2019-12-13 NOTE — ED NOTES
Initial Assessment: VSS. Communicates needs without difficulty. Pleasant and co-op. Coarse breath sounds at the bedside. Breakfast ordered. Denies chest/shoulder/arm pain or discomfort. No acute distress noted.

## 2019-12-13 NOTE — ED NOTES
ED Triage Provider Note  [facility]  Encounter Date: Dec 12, 2019  7:34 PM     History:  Chief Complaint   Patient presents with     Shortness of Breath     Loss of Consciousness     Teetee Reynolds is a 64 year old female who presents with a history of COPD who presents after syncopal episode.  Patient notes URI type symptoms approximately 2 weeks ago that were resolving.  She was out shoveling snow when she developed dizziness with associated shortness of breath.  She went in to her home and fainted.  Sister noted loss of consciousness lasting seconds.  Patient notes nonproductive cough for the past 2 weeks that is worsened over the past week.  She has been using all of her medications for COPD.  She notes slight neck pain related to striking her head on a cooler.      Review of Systems:  Review of Systems   Constitutional: Negative for fever.   HENT: Negative for congestion.    Eyes: Negative for redness.   Respiratory: Positive for cough and shortness of breath.    Cardiovascular: Negative for chest pain.   Gastrointestinal: Negative for abdominal pain.   Genitourinary: Negative for difficulty urinating.   Musculoskeletal: Positive for neck pain. Negative for arthralgias and neck stiffness.   Skin: Negative for color change.   Neurological: Positive for dizziness. Negative for headaches.   Psychiatric/Behavioral: Negative for confusion.         Exam:  /75   Pulse 102   Temp 99.3  F (37.4  C) (Oral)   Wt 70.3 kg (154 lb 14.4 oz)   SpO2 92%   General: No acute distress. Appears stated age.   Cardio: Regular rate, extremities well perfused  Resp: Normal work of breathing, grossly normal respiratory rate, mild end expiratory wheezing with diffuse rhonchi  Neuro: Alert. CN II-XII grossly intact. Grossly intact strength.   Neck: Mild left and lower cervical spine tenderness    Medical Decision Making:  Patient arriving to the ED with problem as above. A medical screening exam was  performed.  Patient noted on exam to have neck tenderness, wheezing and rhonchi in the setting of syncopal episode.  Laboratories and imaging have been ordered.       Note created by Yoan Long MD on 12/12/2019 at 7:34 PM       Yoan Long MD  12/12/19 2028

## 2019-12-13 NOTE — ED PROVIDER NOTES
Patient was seen this morning in the emergency department prior to transfer to the observation unit.  She had just been given a DuoNeb and was feeling somewhat tremulous and anxious.  No chest pain.  Patient has COPD, is not oxygen dependent.  Being admitted to the observation unit for syncope evaluation.    GEN:  Alert, well developed, no acute distress  HEENT:  PERRL, EOMI, Mucous membranes are moist.   Cardio: Mild tachycardia, systolic murmur is present, unchanged per patient, radial pulses equal bilaterally  PULM:  Lungs clear, only slightly decreased air movement, no wheezes, rales (exam done shortly after nebulizer treatment)  Abd:  Soft, normal bowel sounds, no focal tenderness  Back exam:  No CVA tenderness  Musculoskeletal: No C-spine, T-spine, L-spine tenderness.  There is mild tenderness over the trapezius muscle bilaterally.  Neuro:  Alert and oriented X3, Follows commands, moving all extremities spontaneously   Skin:  Warm, dry    Echocardiogram was done this morning, have not seen the results yet.  Depending on the results, and depending on respiratory status given her COPD exacerbation, patient may be able to discharge today.

## 2019-12-13 NOTE — ED TRIAGE NOTES
"Pt came in to the ED for \"fainted\" hit my head can't breathe well- can't eat  Pt presents with her sister who says she just went straight back and fainted. She said she felt dizzy beforehand. She has a hx of COPD.   "

## 2019-12-13 NOTE — ED NOTES
Have updated patient on bed assignment and let her know I will keep her updated on when room will be available.

## 2019-12-14 ENCOUNTER — APPOINTMENT (OUTPATIENT)
Dept: CARDIOLOGY | Facility: CLINIC | Age: 64
End: 2019-12-14
Attending: NURSE PRACTITIONER
Payer: COMMERCIAL

## 2019-12-14 VITALS
HEART RATE: 92 BPM | OXYGEN SATURATION: 95 % | WEIGHT: 154.9 LBS | SYSTOLIC BLOOD PRESSURE: 158 MMHG | RESPIRATION RATE: 16 BRPM | TEMPERATURE: 98 F | DIASTOLIC BLOOD PRESSURE: 86 MMHG

## 2019-12-14 LAB
ALBUMIN SERPL-MCNC: 3.4 G/DL (ref 3.4–5)
ALP SERPL-CCNC: 92 U/L (ref 40–150)
ALT SERPL W P-5'-P-CCNC: 44 U/L (ref 0–50)
ANION GAP SERPL CALCULATED.3IONS-SCNC: 7 MMOL/L (ref 3–14)
AST SERPL W P-5'-P-CCNC: 29 U/L (ref 0–45)
BASOPHILS # BLD AUTO: 0 10E9/L (ref 0–0.2)
BASOPHILS NFR BLD AUTO: 0.4 %
BILIRUB SERPL-MCNC: 0.9 MG/DL (ref 0.2–1.3)
BUN SERPL-MCNC: 11 MG/DL (ref 7–30)
CALCIUM SERPL-MCNC: 8.8 MG/DL (ref 8.5–10.1)
CHLORIDE SERPL-SCNC: 109 MMOL/L (ref 94–109)
CO2 SERPL-SCNC: 25 MMOL/L (ref 20–32)
CREAT SERPL-MCNC: 0.48 MG/DL (ref 0.52–1.04)
DIFFERENTIAL METHOD BLD: ABNORMAL
EOSINOPHIL # BLD AUTO: 0 10E9/L (ref 0–0.7)
EOSINOPHIL NFR BLD AUTO: 0.1 %
ERYTHROCYTE [DISTWIDTH] IN BLOOD BY AUTOMATED COUNT: 13.2 % (ref 10–15)
GFR SERPL CREATININE-BSD FRML MDRD: >90 ML/MIN/{1.73_M2}
GLUCOSE SERPL-MCNC: 112 MG/DL (ref 70–99)
HCT VFR BLD AUTO: 36.2 % (ref 35–47)
HGB BLD-MCNC: 11.8 G/DL (ref 11.7–15.7)
IMM GRANULOCYTES # BLD: 0 10E9/L (ref 0–0.4)
IMM GRANULOCYTES NFR BLD: 0.3 %
LYMPHOCYTES # BLD AUTO: 2.1 10E9/L (ref 0.8–5.3)
LYMPHOCYTES NFR BLD AUTO: 28.7 %
MAGNESIUM SERPL-MCNC: 2.1 MG/DL (ref 1.6–2.3)
MCH RBC QN AUTO: 32.1 PG (ref 26.5–33)
MCHC RBC AUTO-ENTMCNC: 32.6 G/DL (ref 31.5–36.5)
MCV RBC AUTO: 98 FL (ref 78–100)
MONOCYTES # BLD AUTO: 0.9 10E9/L (ref 0–1.3)
MONOCYTES NFR BLD AUTO: 11.7 %
NEUTROPHILS # BLD AUTO: 4.3 10E9/L (ref 1.6–8.3)
NEUTROPHILS NFR BLD AUTO: 58.8 %
NRBC # BLD AUTO: 0 10*3/UL
NRBC BLD AUTO-RTO: 0 /100
PLATELET # BLD AUTO: 350 10E9/L (ref 150–450)
POTASSIUM SERPL-SCNC: 3.7 MMOL/L (ref 3.4–5.3)
PROT SERPL-MCNC: 6.6 G/DL (ref 6.8–8.8)
RBC # BLD AUTO: 3.68 10E12/L (ref 3.8–5.2)
SODIUM SERPL-SCNC: 141 MMOL/L (ref 133–144)
TROPONIN I SERPL-MCNC: <0.015 UG/L (ref 0–0.04)
TSH SERPL DL<=0.005 MIU/L-ACNC: 1.15 MU/L (ref 0.4–4)
WBC # BLD AUTO: 7.3 10E9/L (ref 4–11)

## 2019-12-14 PROCEDURE — 40000556 ZZH STATISTIC PERIPHERAL IV START W US GUIDANCE

## 2019-12-14 PROCEDURE — 25000131 ZZH RX MED GY IP 250 OP 636 PS 637: Performed by: PHYSICIAN ASSISTANT

## 2019-12-14 PROCEDURE — 25000125 ZZHC RX 250: Performed by: PHYSICIAN ASSISTANT

## 2019-12-14 PROCEDURE — 0296T ZIO PATCH HOLTER ADULT PEDIATRIC GREATER THAN 48 HRS: CPT

## 2019-12-14 PROCEDURE — 84443 ASSAY THYROID STIM HORMONE: CPT | Performed by: EMERGENCY MEDICINE

## 2019-12-14 PROCEDURE — 25000132 ZZH RX MED GY IP 250 OP 250 PS 637: Performed by: PHYSICIAN ASSISTANT

## 2019-12-14 PROCEDURE — 85025 COMPLETE CBC W/AUTO DIFF WBC: CPT | Performed by: EMERGENCY MEDICINE

## 2019-12-14 PROCEDURE — 25800030 ZZH RX IP 258 OP 636: Performed by: PHYSICIAN ASSISTANT

## 2019-12-14 PROCEDURE — 94640 AIRWAY INHALATION TREATMENT: CPT

## 2019-12-14 PROCEDURE — 99217 ZZC OBSERVATION CARE DISCHARGE: CPT | Mod: Z6 | Performed by: FAMILY MEDICINE

## 2019-12-14 PROCEDURE — 40000894 ZZH STATISTIC OT IP EVAL DEFER

## 2019-12-14 PROCEDURE — 40000275 ZZH STATISTIC RCP TIME EA 10 MIN

## 2019-12-14 PROCEDURE — 84484 ASSAY OF TROPONIN QUANT: CPT | Performed by: EMERGENCY MEDICINE

## 2019-12-14 PROCEDURE — 83735 ASSAY OF MAGNESIUM: CPT | Performed by: EMERGENCY MEDICINE

## 2019-12-14 PROCEDURE — 96376 TX/PRO/DX INJ SAME DRUG ADON: CPT

## 2019-12-14 PROCEDURE — 80053 COMPREHEN METABOLIC PANEL: CPT | Performed by: EMERGENCY MEDICINE

## 2019-12-14 PROCEDURE — G0378 HOSPITAL OBSERVATION PER HR: HCPCS

## 2019-12-14 PROCEDURE — 0298T ZIO PATCH HOLTER ADULT PEDIATRIC GREATER THAN 48 HRS: CPT | Performed by: INTERNAL MEDICINE

## 2019-12-14 PROCEDURE — 25000128 H RX IP 250 OP 636: Performed by: PHYSICIAN ASSISTANT

## 2019-12-14 PROCEDURE — 36415 COLL VENOUS BLD VENIPUNCTURE: CPT | Performed by: EMERGENCY MEDICINE

## 2019-12-14 RX ORDER — PREDNISONE 20 MG/1
40 TABLET ORAL DAILY
Qty: 6 TABLET | Refills: 0 | Status: SHIPPED | OUTPATIENT
Start: 2019-12-14 | End: 2019-12-17

## 2019-12-14 RX ORDER — AZITHROMYCIN 250 MG/1
250 TABLET, FILM COATED ORAL DAILY
Qty: 4 TABLET | Refills: 0 | Status: SHIPPED | OUTPATIENT
Start: 2019-12-14 | End: 2019-12-18

## 2019-12-14 RX ADMIN — BUDESONIDE 1 MG: 1 SUSPENSION RESPIRATORY (INHALATION) at 07:33

## 2019-12-14 RX ADMIN — CEFTRIAXONE 1 G: 1 INJECTION, POWDER, FOR SOLUTION INTRAMUSCULAR; INTRAVENOUS at 06:28

## 2019-12-14 RX ADMIN — SODIUM CHLORIDE: 9 INJECTION, SOLUTION INTRAVENOUS at 05:41

## 2019-12-14 RX ADMIN — IPRATROPIUM BROMIDE AND ALBUTEROL SULFATE 3 ML: .5; 3 SOLUTION RESPIRATORY (INHALATION) at 07:33

## 2019-12-14 RX ADMIN — LOSARTAN POTASSIUM 50 MG: 50 TABLET, FILM COATED ORAL at 07:49

## 2019-12-14 RX ADMIN — LIDOCAINE 2 PATCH: 560 PATCH PERCUTANEOUS; TOPICAL; TRANSDERMAL at 07:50

## 2019-12-14 RX ADMIN — PREDNISONE 40 MG: 20 TABLET ORAL at 07:49

## 2019-12-14 RX ADMIN — CITALOPRAM HYDROBROMIDE 40 MG: 40 TABLET ORAL at 07:49

## 2019-12-14 NOTE — PLAN OF CARE
Diagnostic tests and consults completed and resulted . Yes, Echo completed  - No further episodes of syncope and any new arrhythmia addressed with controlled heart rates. Yes  - Vital signs normal or at patient baseline and orthostatic vitals are normal and patient not lightheaded with standing. Yes, pt Hypertensive 177/111 after Neb tx this am, will continue to monitor. Improved to 158/86 upon recheck.  BP (!) 158/86   Pulse 92   Temp 98  F (36.7  C) (Oral)   Resp 16   Wt 70.3 kg (154 lb 14.4 oz)   SpO2 95%   - Tolerating oral intake to maintain hydration. Yes. Also receiving iv fluids.  - Safe disposition plan has been identified. Yes, home with Max

## 2019-12-14 NOTE — PLAN OF CARE
Observation Goals:  List all  goals to be met before discharge:   - Diagnostic tests and consults completed and resulted - NO  - No further episodes of syncope and any new arrhythmia addressed with controlled heart rates - Monitoring, pt has minimal dizziness reported since admit on unit, instructed to sit at bedside and call   - Vital signs normal or at patient baseline and orthostatic vitals are normal and patient not lightheaded with standing - YES  /89   Pulse 102   Temp 98.4  F (36.9  C) (Oral)   Resp 18   Wt 70.3 kg (154 lb 14.4 oz)   SpO2 95%   - Tolerating oral intake to maintain hydration - YES  - Safe disposition plan has been identified - NO  - Nurse to notify provider when observation goals have been met and patient is ready for discharge

## 2019-12-14 NOTE — DISCHARGE SUMMARY
Discharge Summary    Teetee Reynolds MRN# [id number]   YOB: 1955 Age: 64 year old     Date of Admission:  12/12/2019  Date of Discharge:  12/14/2019  Admitting Physician:  Donald Zarate MD  Discharge Physician:  Liam Rajan MD   Discharging Service:  Emergency Medicine     Primary Provider: José Miguel Milan          Discharge Diagnosis:     Syncope    * No resolved hospital problems. *  COPD exacerbation             Discharge Disposition:   Discharged to home           Condition on Discharge:   Discharge condition: Stable   Code status on discharge: Full Code           Procedures:   No procedures performed during this admission          Discharge Medications:     Current Discharge Medication List      START taking these medications    Details   azithromycin (ZITHROMAX) 250 MG tablet Take 1 tablet (250 mg) by mouth daily for 4 days  Qty: 4 tablet, Refills: 0    Associated Diagnoses: COPD exacerbation (H)      predniSONE (DELTASONE) 20 MG tablet Take 2 tablets (40 mg) by mouth daily for 3 days  Qty: 6 tablet, Refills: 0    Associated Diagnoses: COPD exacerbation (H)         CONTINUE these medications which have NOT CHANGED    Details   albuterol (PROAIR HFA/PROVENTIL HFA/VENTOLIN HFA) 108 (90 Base) MCG/ACT inhaler Inhale 1-2 puffs into the lungs every 6 hours as needed for shortness of breath / dyspnea or wheezing  Qty: 1 Inhaler, Refills: 3    Associated Diagnoses: COPD exacerbation (H)      albuterol (PROVENTIL) (2.5 MG/3ML) 0.083% neb solution Take 1 vial (2.5 mg) by nebulization 3 times daily as needed for shortness of breath / dyspnea or wheezing (Typically takes 1-2x daily)  Qty: 9 mL, Refills: 0    Associated Diagnoses: COPD exacerbation (H)      aspirin (ASA) 325 MG EC tablet Take 650 mg by mouth 2 times daily as needed for moderate pain (headache)       benzonatate (TESSALON) 100 MG capsule Take 1 capsule (100 mg) by mouth 3 times daily as needed for cough  Qty: 90 capsule, Refills: 0     Associated Diagnoses: COPD exacerbation (H)      citalopram (CELEXA) 40 MG tablet Take 40 mg by mouth daily      fluticasone (FLONASE) 50 MCG/ACT nasal spray Spray 1 spray into both nostrils daily  Qty: 9.9 mL, Refills: 0    Associated Diagnoses: Pneumonia of both lower lobes due to infectious organism (H)      fluticasone-salmeterol (ADVAIR DISKUS) 250-50 MCG/DOSE inhaler Inhale 1 puff into the lungs 2 times daily      gabapentin (NEURONTIN) 600 MG tablet Take 600 mg by mouth 3 times daily as needed      ibuprofen (ADVIL/MOTRIN) 200 MG tablet Take 400 mg by mouth 2 times daily as needed for mild pain       losartan (COZAAR) 50 MG tablet Take 1 tablet (50 mg) by mouth daily  Qty: 7 tablet, Refills: 0    Associated Diagnoses: Mild episode of recurrent major depressive disorder (H)      nicotine (NICODERM CQ) 7 MG/24HR 24 hr patch Place 1 patch onto the skin every 24 hours      nicotine polacrilex (NICORETTE) 4 MG gum Place 1 each (4 mg) inside cheek every hour as needed for smoking cessation  Qty: 100 each, Refills: 0    Associated Diagnoses: Tobacco abuse      traZODone (DESYREL) 150 MG tablet Take 1 tablet (150 mg) by mouth nightly as needed for sleep  Qty: 5 tablet, Refills: 0    Associated Diagnoses: Mild episode of recurrent major depressive disorder (H)      umeclidinium (INCRUSE ELLIPTA) 62.5 MCG/INH inhaler Inhale 1 puff into the lungs daily         STOP taking these medications       gabapentin (NEURONTIN) 100 MG capsule Comments:   Reason for Stopping:         nicotine (NICODERM CQ) 21 MG/24HR 24 hr patch Comments:   Reason for Stopping:                     Consultations:   No consultations were requested during this admission             Brief History of Illness:   Please see detailed H&P from 12/12/19, in brief: Teetee Reynolds is a 64 year old female admitted on 12/12/2019. She has a history of COPD (on albuterol), HTN, and anxiety who presents to the ED accompanied by family member/friend for evaluation  following a syncopal episode.          Hospital Course:   1. Syncope: Was shoveling snow outside and felt dizzy; came inside; she was standing talking to her sister and felt extremely lightheaded, nauseous, and was heading to the room to lay down but before she could get away she fell backward and hit her head on a plastic cooler and was up in just a few seconds with the help of her sister and in a chair. She states she is foggy on what happened. Soon after she had an episode of emesis. She admits to decreased po intake and appetite with her recent respiratory symptoms as well. In ED, HR 70's-90's, 's-140's/70's-100's, RR 11-30, SaO2 90-94% on RA, Temp 99.3. Labs show CMP with ALT 72, AST 75, glucose 109 otherwise normal. CBC normal with WBC 10. Troponin I negative x 1. Flu A/B negative. UA negative. CT Head negative. CT C-spine no acute fracture; postsurgical changes of anterior cervical discectomy and fusion spanning C4-7; hardware appears intact. CXR negative.  She is being admitted for syncope work up. Serial troponins have been negative. No events on tele. Orthostatics have been negative. Echocardiogram without acute abnormalities, see full epic report for details.  The LVOT gradient was difficult to assess due to tachycardia and cardiology recommended repeat ECHO when tachycardia improved.  Patient had no further lightheadedness or near syncope.  VSS, afebrile.  No ectopy on telemetry.  Orthostatics normal.  She was discharged with zio patch for 14 days and follow up with PCP in 1-2 weeks, repeat ECHO as outpatient ordered as well.  Patient was given all results and instructed to follow up with her PCP in one week.  Return if the patient has new or worsening chest pain, SOB, nausea, or lightheadedness. Patient was in agreement with the plan and was discharged to home in good condition    2. Fall/Neck Pain: fell backwards hitting the back of her head and neck on cooler. Complains of acute on chronic neck  pain.  CT Head negative. CT C-spine no acute fracture; postsurgical changes of anterior cervical discectomy and fusion spanning C4-7; hardware appears intact.   - follow up with PCP     3. COPD Exacerbation:  cough, SOB for the last 2 weeks associated with subjective fever and chills, headache. She was admitted to Obs in June for COPD exacerbation and then transferred inpatient as she continued to have O2 requirement. Patient is concerned that her sisters basement may be moldy which is where she has been living since May 2019.  RR 11-30, SaO2 90-94% on RA, Temp 99.3. CBC normal with WBC 10. Flu A/B negative. CXR negative. Pt still coughing quite a bit. No fever.  Patient had no wheezing or SOB on exam on 12/14/19.  She has a neb machine at home as well as rescue inhalers. She was continued on prednisone 40 mg po daily for 3 more days and azithromycin 250 mg po daily for 4 more days.   - return if worsening     4. Tobacco Dependence: had tobacco cessation counseling at last admission in June 2019       5. Mild Transaminitis: ALT 72, AST 75. Previously normal earlier this year.  Normalized prior to discharge.   - follow up with PCP     Chronic Medical Problems:  # HTN: - Continue PTA Losartan 50 mg daily     # Depression/anxiety: - Continue PTA Gabapentin, Citalopram and Trazodone             Final Day of Progress before Discharge:       Physical Exam:  Blood pressure (!) 177/111, pulse 88, temperature 98  F (36.7  C), temperature source Oral, resp. rate 16, weight 70.3 kg (154 lb 14.4 oz), SpO2 95 %.    EXAM:  Exam:  Constitutional: healthy, alert and no distress  Cardiovascular: No lifts, heaves, or thrills. RRR. No murmurs, clicks gallops or rub  Respiratory: Good diaphragmatic excursion. Lungs clear  Gastrointestinal: Abdomen soft, non-tender. BS normal. No masses, organomegaly  Musculoskeletal: extremities normal- no gross deformities noted, gait normal and normal muscle tone  Skin: no suspicious lesions or  rashes  Neurologic: Gait normal. Alert and oriented.   Psychiatric: mentation appears normal and affect normal/bright    BP (!) 158/86   Pulse 92   Temp 98  F (36.7  C) (Oral)   Resp 16   Wt 70.3 kg (154 lb 14.4 oz)   SpO2 95%              Data:  All laboratory data reviewed             Significant Results:     Results for orders placed or performed during the hospital encounter of 12/12/19   CBC with platelets differential     Status: None   Result Value Ref Range    WBC 10.0 4.0 - 11.0 10e9/L    RBC Count 4.21 3.8 - 5.2 10e12/L    Hemoglobin 13.1 11.7 - 15.7 g/dL    Hematocrit 40.5 35.0 - 47.0 %    MCV 96 78 - 100 fl    MCH 31.1 26.5 - 33.0 pg    MCHC 32.3 31.5 - 36.5 g/dL    RDW 12.8 10.0 - 15.0 %    Platelet Count 389 150 - 450 10e9/L    Diff Method Automated Method     % Neutrophils 79.2 %    % Lymphocytes 13.2 %    % Monocytes 6.5 %    % Eosinophils 0.2 %    % Basophils 0.3 %    % Immature Granulocytes 0.6 %    Nucleated RBCs 0 0 /100    Absolute Neutrophil 7.9 1.6 - 8.3 10e9/L    Absolute Lymphocytes 1.3 0.8 - 5.3 10e9/L    Absolute Monocytes 0.7 0.0 - 1.3 10e9/L    Absolute Eosinophils 0.0 0.0 - 0.7 10e9/L    Absolute Basophils 0.0 0.0 - 0.2 10e9/L    Abs Immature Granulocytes 0.1 0 - 0.4 10e9/L    Absolute Nucleated RBC 0.0    Comprehensive metabolic panel     Status: Abnormal   Result Value Ref Range    Sodium 133 133 - 144 mmol/L    Potassium 3.7 3.4 - 5.3 mmol/L    Chloride 98 94 - 109 mmol/L    Carbon Dioxide 23 20 - 32 mmol/L    Anion Gap 12 3 - 14 mmol/L    Glucose 109 (H) 70 - 99 mg/dL    Urea Nitrogen 12 7 - 30 mg/dL    Creatinine 1.02 0.52 - 1.04 mg/dL    GFR Estimate 58 (L) >60 mL/min/[1.73_m2]    GFR Estimate If Black 68 >60 mL/min/[1.73_m2]    Calcium 9.2 8.5 - 10.1 mg/dL    Bilirubin Total 0.3 0.2 - 1.3 mg/dL    Albumin 4.0 3.4 - 5.0 g/dL    Protein Total 7.4 6.8 - 8.8 g/dL    Alkaline Phosphatase 121 40 - 150 U/L    ALT 72 (H) 0 - 50 U/L    AST 75 (H) 0 - 45 U/L   Troponin I     Status:  None   Result Value Ref Range    Troponin I ES <0.015 0.000 - 0.045 ug/L   Troponin I - Now then in 4 hours x 2     Status: None   Result Value Ref Range    Troponin I ES <0.015 0.000 - 0.045 ug/L   UA with Microscopic reflex to Culture     Status: Abnormal   Result Value Ref Range    Color Urine Yellow     Appearance Urine Clear     Glucose Urine Negative NEG^Negative mg/dL    Bilirubin Urine Negative NEG^Negative    Ketones Urine 40 (A) NEG^Negative mg/dL    Specific Gravity Urine 1.016 1.003 - 1.035    Blood Urine Small (A) NEG^Negative    pH Urine 5.5 5.0 - 7.0 pH    Protein Albumin Urine 10 (A) NEG^Negative mg/dL    Urobilinogen mg/dL Normal 0.0 - 2.0 mg/dL    Nitrite Urine Negative NEG^Negative    Leukocyte Esterase Urine Negative NEG^Negative    Source Midstream Urine     WBC Urine <1 0 - 5 /HPF    RBC Urine 1 0 - 2 /HPF    Squamous Epithelial /HPF Urine <1 0 - 1 /HPF   CBC with platelets differential     Status: Abnormal   Result Value Ref Range    WBC 7.3 4.0 - 11.0 10e9/L    RBC Count 3.68 (L) 3.8 - 5.2 10e12/L    Hemoglobin 11.8 11.7 - 15.7 g/dL    Hematocrit 36.2 35.0 - 47.0 %    MCV 98 78 - 100 fl    MCH 32.1 26.5 - 33.0 pg    MCHC 32.6 31.5 - 36.5 g/dL    RDW 13.2 10.0 - 15.0 %    Platelet Count 350 150 - 450 10e9/L    Diff Method Automated Method     % Neutrophils 58.8 %    % Lymphocytes 28.7 %    % Monocytes 11.7 %    % Eosinophils 0.1 %    % Basophils 0.4 %    % Immature Granulocytes 0.3 %    Nucleated RBCs 0 0 /100    Absolute Neutrophil 4.3 1.6 - 8.3 10e9/L    Absolute Lymphocytes 2.1 0.8 - 5.3 10e9/L    Absolute Monocytes 0.9 0.0 - 1.3 10e9/L    Absolute Eosinophils 0.0 0.0 - 0.7 10e9/L    Absolute Basophils 0.0 0.0 - 0.2 10e9/L    Abs Immature Granulocytes 0.0 0 - 0.4 10e9/L    Absolute Nucleated RBC 0.0    Comprehensive metabolic panel     Status: Abnormal   Result Value Ref Range    Sodium 141 133 - 144 mmol/L    Potassium 3.7 3.4 - 5.3 mmol/L    Chloride 109 94 - 109 mmol/L    Carbon Dioxide  25 20 - 32 mmol/L    Anion Gap 7 3 - 14 mmol/L    Glucose 112 (H) 70 - 99 mg/dL    Urea Nitrogen 11 7 - 30 mg/dL    Creatinine 0.48 (L) 0.52 - 1.04 mg/dL    GFR Estimate >90 >60 mL/min/[1.73_m2]    GFR Estimate If Black >90 >60 mL/min/[1.73_m2]    Calcium 8.8 8.5 - 10.1 mg/dL    Bilirubin Total 0.9 0.2 - 1.3 mg/dL    Albumin 3.4 3.4 - 5.0 g/dL    Protein Total 6.6 (L) 6.8 - 8.8 g/dL    Alkaline Phosphatase 92 40 - 150 U/L    ALT 44 0 - 50 U/L    AST 29 0 - 45 U/L   Magnesium     Status: None   Result Value Ref Range    Magnesium 2.1 1.6 - 2.3 mg/dL   Troponin I     Status: None   Result Value Ref Range    Troponin I ES <0.015 0.000 - 0.045 ug/L   TSH with free T4 reflex     Status: None   Result Value Ref Range    TSH 1.15 0.40 - 4.00 mU/L   EKG 12 lead     Status: None   Result Value Ref Range    Interpretation ECG Click View Image link to view waveform and result    Influenza A/B antigen     Status: None   Result Value Ref Range    Influenza A/B Agn Specimen Nasopharyngeal     Influenza A Negative NEG^Negative    Influenza B Negative NEG^Negative      Recent Results (from the past 48 hour(s))   XR Chest 2 Views    Narrative    Exam:  Chest X-ray 12/12/2019 8:43 PM    History: Cough, history of COPD, rule out infiltrate    Comparison: 6/25/2019    Findings: PA and lateral chest radiograph. Cardiac size within normal  limits. Pulmonary vasculature is distinct. No pleural effusion or  pneumothorax. No focal pulmonary opacities. Straightening of the  thoracic spine. Cervical fusion hardware.      Impression    Impression: No acute cardiopulmonary abnormalities.    I have personally reviewed the examination and initial interpretation  and I agree with the findings.    LAYNE LEARY MD   Head CT w/o contrast    Narrative     CT HEAD W/O CONTRAST 12/12/2019 8:55 PM    Provided History: Head trauma, minor, GCS>=13, low clinical risk,  initial exam    Comparison: None.    Technique: Using multidetector thin  collimation helical acquisition  technique, axial, coronal and sagittal CT images from the skull base  to the vertex were obtained without intravenous contrast.     Findings:    No intracranial hemorrhage, mass effect, or midline shift. The  ventricles are proportionate to the cerebral sulci. The gray to white  matter differentiation of the cerebral hemispheres is preserved. The  basal cisterns are patent. Bilateral basal ganglia calcifications.    The bony calvarium and the bones and skull base are intact. Mucosal  thickening of the paranasal sinuses. The mastoid air cells are clear.  The orbits are unremarkable.       Impression    Impression:No acute intracranial pathology.    I have personally reviewed the examination and initial interpretation  and I agree with the findings.    ELIJAH MUNIZ MD   Cervical spine CT w/o contrast    Narrative    CT CERVICAL SPINE W/O CONTRAST 12/12/2019 8:56 PM    Provided History: C-spine trauma, low clinical risk (NEXUS/CCR);  Syncopal episode, struck head, left-sided and central neck pain    Comparison: None    Technique: Using multidetector thin collimation helical acquisition  technique, axial, coronal and sagittal CT images through the cervical  spine were obtained without intravenous contrast.     Findings:  Postsurgical changes of anterior cervical discectomy and fusion  spanning C4-C7. The hardware appears intact. No evidence of acute  fracture or subluxation. There is disc space narrowing at C7-T1 and  T1-T2.. No prevertebral edema.     The findings on a level by level basis are as follows:    C2-3: No spinal canal or neural foraminal stenosis.    C3-4:  Right uncinate spurring. Moderate neuroforaminal narrowing. No  spinal canal stenosis.    C4-5:  Bilateral facet arthropathy and uncinate spurring, resulting in  mild bilateral neuroforaminal narrowing. No spinal canal stenosis.    C5-6:  Bilateral facet arthropathy and uncinate spurring, resulting in  mild right  and moderate left neuroforaminal narrowing. No spinal canal  stenosis.    C6-7:  Bilateral facet arthropathy and uncinate spurring, resulting in  moderate bilateral neuroforaminal narrowing. No spinal canal stenosis.    C7-T1:  Right uncinate spurring, resulting in moderate right  neuroforaminal narrowing. No spinal canal stenosis.     No abnormality of the paraspinous soft tissues. Visualized lung apices  are clear.      Impression    Impression:   1. No acute fracture or subluxation of the cervical spine.  2. Postsurgical changes of anterior cervical discectomy and fusion  spanning C4-C7. The hardware appears intact.     I have personally reviewed the examination and initial interpretation  and I agree with the findings.    ELIJAH MUNIZ MD   Echocardiogram Complete    Narrative    [id number]  [id number]  [id number]  [id number]^MADELYN^DARREN^RICHARD           [facility],[place]  Echocardiography Laboratory  [address] [place] [id number]     Name: BERTIN WETZEL  MRN: [id number]  : 1955  Study Date: 2019 09:34 AM  Age: 64 yrs  Gender: Female  Patient Location: [facility]  Reason For Study: Syncope  Ordering Physician: DARREN JOSHI  Performed By: MELLY Montero     BSA: 1.7 m2  Height: 62 in  Weight: 154 lb  HR: 110  BP: 121/85 mmHg  _____________________________________________________________________________  __        Procedure  Echocardiogram with two-dimensional, color and spectral Doppler performed.  _____________________________________________________________________________  __        Interpretation Summary  Global and regional left ventricular function is hyperkinetic with an EF >70%.  Right ventricular function, chamber size, wall motion, and thickness are  normal.  No significant valvular abnormalities were noted.  The inferior vena cava was normal in size with preserved respiratory  variability.  No pericardial effusion is present.  Previous study not available  for comparison.  Patient has sinus tachycardia during exam with LVOT gradient likely related to  tachycardia. Consider a repeat echo once patient`s HR is slower.  _____________________________________________________________________________  __        Left Ventricle  Left ventricular size is normal. Global and regional left ventricular function  is hyperkinetic with an EF >70%. Relative wall thickness is increased  consistent with concentric remodeling. Left ventricular diastolic function is  indeterminate.     Right Ventricle  Right ventricular function, chamber size, wall motion, and thickness are  normal.     Atria  Both atria appear normal.     Mitral Valve  The mitral valve is normal.        Aortic Valve  Aortic valve is normal in structure and function.     Tricuspid Valve  Trace tricuspid insufficiency is present. The right ventricular systolic  pressure is approximated at 9.2 mmHg plus the right atrial pressure.     Pulmonic Valve  The pulmonic valve cannot be assessed.     Vessels  The thoracic aorta is normal. The aorta root is normal. The inferior vena cava  was normal in size with preserved respiratory variability. IVC diameter <2.1  cm collapsing >50% with sniff suggests a normal RA pressure of 3 mmHg.     Pericardium  No pericardial effusion is present.        Compared to Previous Study  Previous study not available for comparison.  _____________________________________________________________________________  __  MMode/2D Measurements & Calculations     IVSd: 1.2 cm  LVIDd: 3.4 cm  LVIDs: 2.4 cm  LVPWd: 0.98 cm  FS: 31.1 %  LV mass(C)d: 114.0 grams  LV mass(C)dI: 66.7 grams/m2  Ao root diam: 3.5 cm  asc Aorta Diam: 3.2 cm  LVOT diam: 2.2 cm  LVOT area: 3.8 cm2  RWT: 0.57        Doppler Measurements & Calculations  MV E max jm: 82.4 cm/sec  MV A max jm: 175.0 cm/sec  MV E/A: 0.47  PA acc time: 0.07 sec  TR max jm: 152.0 cm/sec  TR max P.2 mmHg  Lateral E/e': 8.6         _____________________________________________________________________________  __        Report approved by: Leonie ARBOLEDA 12/13/2019 11:04 AM                   Pending Results:   Unresulted Labs Ordered in the Past 30 Days of this Admission     No orders found from 11/12/2019 to 12/13/2019.                  Discharge Instructions and Follow-Up:     Discharge Procedure Orders   Reason for your hospital stay   Order Comments: You were admitted to the observation unit for monitoring after a syncopal episode.  You had no abnormalities on EKG or heart monitor.  Labs checking for heart damage were negative.  CT imaging of head and neck did not show any acute abnormalities. Echocardiogram was normal, but some areas were difficult to accurately assess due to your fast heart rate, we recommend (and it has been ordered) that you have a repeat echocardiogram in one month.  You were started on steroids and antibiotics for possible exacerbation of your COPD as well.  A portable heart monitor was placed prior to discharge.     Adult [facility] Follow-up and recommended labs and tests   Order Comments: Follow up with primary care provider, José Miguel Milan, within 7 days for hospital follow- up.  The following labs/tests are recommended: echocardiogram in one month.      Appointments on [place] and/or [facility] (with [facility] or [facility] provider or service). Call [phone] if you haven't heard regarding these appointments within 7 days of discharge.     Activity   Order Comments: Your activity upon discharge: activity as tolerated     Order Specific Question Answer Comments   Is discharge order? Yes      When to contact your care team   Order Comments: Return to the ER if you have new or worsening chest pain, shortness of breath, jaw or arm pain, or fainting.     Echocardiogram Complete   Standing Status: Future Standing Exp. Date: 12/14/20   Order Comments: Administration of IV contrast will be tailored to this examination  per the appropriate written protocol listed in the Echocardiography department Protocol Book, or by the supervising Cardiologist. This may result in an order change.    Use of contrast is at the discretion of the supervising Cardiologist.   Scheduling Instructions: Please call your clinic of choice to schedule this procedure:    [facility]:   [phone]  [facility]:   [phone]  [facility]:  [phone]  [facility]): [phone]  [facility]:   [phone]  [facility]:  [phone]  [facility]): [phone]  [facility] [phone]     Order Specific Question Answer Comments   Procedure to be scheduled at [facility] [12]    Perform bubble study? No    Use strain protocol? (Cardiology use only) No    Preferred procedure? Echo Complete      Pulse Oximeter Equipment     Diet   Order Comments: Follow this diet upon discharge: Orders Placed This Encounter      Combination Diet Regular Diet Adult;     Order Specific Question Answer Comments   Is discharge order? Yes           Attestation:  CHRISTINE Hernandez CNP.

## 2019-12-14 NOTE — PLAN OF CARE
6D OT Eval Deferral    Discharge Planner OT   Patient plan for discharge: home with A as needed  Current status: OT orders received. Per discussion with pt, pt has been up in her rm amb and performing her ADLs IND. Pt lives with her sister who is a retired RN and her sisters   Barriers to return to prior living situation: non per OT  Recommendations for discharge: Home with A as needed from family  Rationale for recommendations: per pt report and chart review, pt at baseline for ADL/IADLs and with no new precautions after fall. Recommend discharge home with A as needed from family. Will complete OT orders at this time.        Entered by: Gia Fraser 12/14/2019 8:53 AM

## 2019-12-14 NOTE — PLAN OF CARE
Diagnostic tests and consults completed and resulted . No  - No further episodes of syncope and any new arrhythmia addressed with controlled heart rates. Yes  - Vital signs normal or at patient baseline and orthostatic vitals are normal and patient not lightheaded with standing. Yes  - Tolerating oral intake to maintain hydration. Yes. Also receiving iv fluids.  - Safe disposition plan has been identified. Yes

## 2024-04-29 ENCOUNTER — HOSPITAL ENCOUNTER (OUTPATIENT)
Facility: CLINIC | Age: 69
End: 2024-04-29
Attending: ORTHOPAEDIC SURGERY | Admitting: ORTHOPAEDIC SURGERY
Payer: COMMERCIAL

## 2025-02-27 NOTE — TELEPHONE ENCOUNTER
Attempted to reach Ms. Reynolds as part of [facility]'s post-discharge COPD Education follow-up. Patient didn't answer her phone. Left voicemail, instructing patient to call back with questions and concerns. Will try to reach out to patient again in two weeks.     Wilfrid Callahan, ALBA  Chronic Pulmonary Disease Specialist  Cardiopulmonary Services   Office: [phone]  Pager: [phone]     Spoke with patient. At this time Dr. Moon would prefer the patient to follow up with Dr. Hwang (post hospital) or keep her visits with Lynn Barrios. Patient states at this times she will continue care with Lynn.